# Patient Record
Sex: FEMALE | Race: WHITE | Employment: OTHER | URBAN - METROPOLITAN AREA
[De-identification: names, ages, dates, MRNs, and addresses within clinical notes are randomized per-mention and may not be internally consistent; named-entity substitution may affect disease eponyms.]

---

## 2017-11-07 ENCOUNTER — RECORDS - HEALTHEAST (OUTPATIENT)
Dept: LAB | Facility: CLINIC | Age: 82
End: 2017-11-07

## 2017-11-08 LAB — HBA1C MFR BLD: 6.8 % (ref 4.2–6.1)

## 2018-01-09 ENCOUNTER — RECORDS - HEALTHEAST (OUTPATIENT)
Dept: LAB | Facility: CLINIC | Age: 83
End: 2018-01-09

## 2018-01-10 LAB — INR PPP: 2.18 (ref 0.9–1.1)

## 2018-01-12 ENCOUNTER — HOSPITAL ENCOUNTER (OUTPATIENT)
Dept: MRI IMAGING | Facility: CLINIC | Age: 83
Discharge: HOME | End: 2018-01-12
Payer: MEDICARE

## 2018-01-12 DIAGNOSIS — M25.512 CHRONIC LEFT SHOULDER PAIN: ICD-10-CM

## 2018-01-12 DIAGNOSIS — G89.29 CHRONIC LEFT SHOULDER PAIN: ICD-10-CM

## 2018-01-12 PROCEDURE — 73221 MRI JOINT UPR EXTREM W/O DYE: CPT | Mod: LT

## 2018-01-30 ENCOUNTER — RECORDS - HEALTHEAST (OUTPATIENT)
Dept: LAB | Facility: CLINIC | Age: 83
End: 2018-01-30

## 2018-01-31 LAB — INR PPP: 2.61 (ref 0.9–1.1)

## 2018-02-21 ENCOUNTER — RECORDS - HEALTHEAST (OUTPATIENT)
Dept: LAB | Facility: CLINIC | Age: 83
End: 2018-02-21

## 2018-02-21 LAB
ANION GAP SERPL CALCULATED.3IONS-SCNC: 5 MMOL/L (ref 5–18)
BUN SERPL-MCNC: 19 MG/DL (ref 8–28)
CALCIUM SERPL-MCNC: 9.4 MG/DL (ref 8.5–10.5)
CHLORIDE BLD-SCNC: 106 MMOL/L (ref 98–107)
CO2 SERPL-SCNC: 29 MMOL/L (ref 22–31)
CREAT SERPL-MCNC: 0.68 MG/DL (ref 0.6–1.1)
GFR SERPL CREATININE-BSD FRML MDRD: >60 ML/MIN/1.73M2
GLUCOSE BLD-MCNC: 106 MG/DL (ref 70–125)
INR PPP: 3.15 (ref 0.9–1.1)
POTASSIUM BLD-SCNC: 4.3 MMOL/L (ref 3.5–5)
SODIUM SERPL-SCNC: 140 MMOL/L (ref 136–145)

## 2018-03-06 ENCOUNTER — RECORDS - HEALTHEAST (OUTPATIENT)
Dept: LAB | Facility: CLINIC | Age: 83
End: 2018-03-06

## 2018-03-07 LAB — INR PPP: 2.68 (ref 0.9–1.1)

## 2018-03-20 ENCOUNTER — RECORDS - HEALTHEAST (OUTPATIENT)
Dept: LAB | Facility: CLINIC | Age: 83
End: 2018-03-20

## 2018-03-21 LAB — INR PPP: 2.86 (ref 0.9–1.1)

## 2018-03-23 ENCOUNTER — RECORDS - HEALTHEAST (OUTPATIENT)
Dept: LAB | Facility: CLINIC | Age: 83
End: 2018-03-23

## 2018-03-26 LAB — INR PPP: 2.37 (ref 0.9–1.1)

## 2018-04-02 ENCOUNTER — RECORDS - HEALTHEAST (OUTPATIENT)
Dept: LAB | Facility: CLINIC | Age: 83
End: 2018-04-02

## 2018-04-02 LAB — INR PPP: 2.15 (ref 0.9–1.1)

## 2018-04-13 ENCOUNTER — RECORDS - HEALTHEAST (OUTPATIENT)
Dept: LAB | Facility: CLINIC | Age: 83
End: 2018-04-13

## 2018-04-16 LAB — INR PPP: 2.74 (ref 0.9–1.1)

## 2018-04-27 ENCOUNTER — RECORDS - HEALTHEAST (OUTPATIENT)
Dept: LAB | Facility: CLINIC | Age: 83
End: 2018-04-27

## 2018-04-30 LAB — INR PPP: 2.63 (ref 0.9–1.1)

## 2018-05-21 ENCOUNTER — RECORDS - HEALTHEAST (OUTPATIENT)
Dept: LAB | Facility: CLINIC | Age: 83
End: 2018-05-21

## 2018-05-22 LAB — INR PPP: 4.19 (ref 0.9–1.1)

## 2018-05-23 ENCOUNTER — RECORDS - HEALTHEAST (OUTPATIENT)
Dept: LAB | Facility: CLINIC | Age: 83
End: 2018-05-23

## 2018-05-23 LAB — INR PPP: 3.97 (ref 0.9–1.1)

## 2018-05-24 ENCOUNTER — RECORDS - HEALTHEAST (OUTPATIENT)
Dept: LAB | Facility: CLINIC | Age: 83
End: 2018-05-24

## 2018-05-25 LAB — INR PPP: 1.6 (ref 0.9–1.1)

## 2018-05-29 ENCOUNTER — RECORDS - HEALTHEAST (OUTPATIENT)
Dept: LAB | Facility: CLINIC | Age: 83
End: 2018-05-29

## 2018-05-29 LAB — INR PPP: 1.69 (ref 0.9–1.1)

## 2018-05-31 ENCOUNTER — RECORDS - HEALTHEAST (OUTPATIENT)
Dept: LAB | Facility: CLINIC | Age: 83
End: 2018-05-31

## 2018-06-01 LAB — INR PPP: 2.17 (ref 0.9–1.1)

## 2018-06-07 ENCOUNTER — RECORDS - HEALTHEAST (OUTPATIENT)
Dept: LAB | Facility: CLINIC | Age: 83
End: 2018-06-07

## 2018-06-07 LAB — INR PPP: 2.29 (ref 0.9–1.1)

## 2018-06-13 ENCOUNTER — RECORDS - HEALTHEAST (OUTPATIENT)
Dept: LAB | Facility: CLINIC | Age: 83
End: 2018-06-13

## 2018-06-14 LAB
INR PPP: 2.26 (ref 0.9–1.1)
TSH SERPL DL<=0.005 MIU/L-ACNC: 14.75 UIU/ML (ref 0.3–5)

## 2018-06-27 ENCOUNTER — RECORDS - HEALTHEAST (OUTPATIENT)
Dept: LAB | Facility: CLINIC | Age: 83
End: 2018-06-27

## 2018-06-28 LAB — INR PPP: 1.99 (ref 0.9–1.1)

## 2018-07-11 ENCOUNTER — RECORDS - HEALTHEAST (OUTPATIENT)
Dept: LAB | Facility: CLINIC | Age: 83
End: 2018-07-11

## 2018-07-12 LAB — INR PPP: 2.41 (ref 0.9–1.1)

## 2018-07-23 ENCOUNTER — RECORDS - HEALTHEAST (OUTPATIENT)
Dept: LAB | Facility: CLINIC | Age: 83
End: 2018-07-23

## 2018-07-23 LAB — INR PPP: 2.49 (ref 0.9–1.1)

## 2018-07-25 ENCOUNTER — RECORDS - HEALTHEAST (OUTPATIENT)
Dept: LAB | Facility: CLINIC | Age: 83
End: 2018-07-25

## 2018-07-26 LAB — TSH SERPL DL<=0.005 MIU/L-ACNC: 5.47 UIU/ML (ref 0.3–5)

## 2018-08-03 ENCOUNTER — RECORDS - HEALTHEAST (OUTPATIENT)
Dept: LAB | Facility: CLINIC | Age: 83
End: 2018-08-03

## 2018-08-06 LAB — INR PPP: 3.33 (ref 0.9–1.1)

## 2018-08-11 ENCOUNTER — RECORDS - HEALTHEAST (OUTPATIENT)
Dept: LAB | Facility: CLINIC | Age: 83
End: 2018-08-11

## 2018-08-13 LAB — INR PPP: 1.96 (ref 0.9–1.1)

## 2018-08-17 ENCOUNTER — RECORDS - HEALTHEAST (OUTPATIENT)
Dept: LAB | Facility: CLINIC | Age: 83
End: 2018-08-17

## 2018-08-20 LAB — INR PPP: 2.11 (ref 0.9–1.1)

## 2018-08-25 ENCOUNTER — RECORDS - HEALTHEAST (OUTPATIENT)
Dept: LAB | Facility: CLINIC | Age: 83
End: 2018-08-25

## 2018-08-27 LAB — INR PPP: 2.29 (ref 0.9–1.1)

## 2018-09-07 ENCOUNTER — RECORDS - HEALTHEAST (OUTPATIENT)
Dept: LAB | Facility: CLINIC | Age: 83
End: 2018-09-07

## 2018-09-10 LAB — INR PPP: 2.05 (ref 0.9–1.1)

## 2018-09-24 ENCOUNTER — RECORDS - HEALTHEAST (OUTPATIENT)
Dept: LAB | Facility: CLINIC | Age: 83
End: 2018-09-24

## 2018-09-24 LAB — INR PPP: 2.69 (ref 0.9–1.1)

## 2018-10-15 ENCOUNTER — RECORDS - HEALTHEAST (OUTPATIENT)
Dept: LAB | Facility: CLINIC | Age: 83
End: 2018-10-15

## 2018-10-16 LAB — INR PPP: 2.12 (ref 0.9–1.1)

## 2018-10-29 ENCOUNTER — RECORDS - HEALTHEAST (OUTPATIENT)
Dept: LAB | Facility: CLINIC | Age: 83
End: 2018-10-29

## 2018-10-30 LAB — INR PPP: 2.09 (ref 0.9–1.1)

## 2018-11-19 ENCOUNTER — RECORDS - HEALTHEAST (OUTPATIENT)
Dept: LAB | Facility: CLINIC | Age: 83
End: 2018-11-19

## 2018-11-20 LAB — INR PPP: 2.21 (ref 0.9–1.1)

## 2018-12-11 ENCOUNTER — RECORDS - HEALTHEAST (OUTPATIENT)
Dept: LAB | Facility: CLINIC | Age: 83
End: 2018-12-11

## 2018-12-11 LAB — INR PPP: 2.84 (ref 0.9–1.1)

## 2019-01-07 ENCOUNTER — RECORDS - HEALTHEAST (OUTPATIENT)
Dept: LAB | Facility: CLINIC | Age: 84
End: 2019-01-07

## 2019-01-08 LAB — INR PPP: 5.31 (ref 0.9–1.1)

## 2019-01-10 ENCOUNTER — RECORDS - HEALTHEAST (OUTPATIENT)
Dept: LAB | Facility: CLINIC | Age: 84
End: 2019-01-10

## 2019-01-10 LAB — INR PPP: 3.74 (ref 0.9–1.1)

## 2019-01-11 ENCOUNTER — RECORDS - HEALTHEAST (OUTPATIENT)
Dept: LAB | Facility: CLINIC | Age: 84
End: 2019-01-11

## 2019-01-11 LAB — INR PPP: 2.29 (ref 0.9–1.1)

## 2019-01-12 ENCOUNTER — RECORDS - HEALTHEAST (OUTPATIENT)
Dept: LAB | Facility: CLINIC | Age: 84
End: 2019-01-12

## 2019-01-14 LAB — INR PPP: 1.91 (ref 0.9–1.1)

## 2019-01-17 ENCOUNTER — RECORDS - HEALTHEAST (OUTPATIENT)
Dept: LAB | Facility: CLINIC | Age: 84
End: 2019-01-17

## 2019-01-18 LAB — INR PPP: 2.93 (ref 0.9–1.1)

## 2019-01-21 ENCOUNTER — RECORDS - HEALTHEAST (OUTPATIENT)
Dept: LAB | Facility: CLINIC | Age: 84
End: 2019-01-21

## 2019-01-22 LAB — INR PPP: 2.97 (ref 0.9–1.1)

## 2019-01-29 ENCOUNTER — RECORDS - HEALTHEAST (OUTPATIENT)
Dept: LAB | Facility: CLINIC | Age: 84
End: 2019-01-29

## 2019-01-29 LAB — INR PPP: 1.89 (ref 0.9–1.1)

## 2019-02-04 ENCOUNTER — RECORDS - HEALTHEAST (OUTPATIENT)
Dept: LAB | Facility: CLINIC | Age: 84
End: 2019-02-04

## 2019-02-05 LAB — INR PPP: 1.78 (ref 0.9–1.1)

## 2019-02-11 ENCOUNTER — RECORDS - HEALTHEAST (OUTPATIENT)
Dept: LAB | Facility: CLINIC | Age: 84
End: 2019-02-11

## 2019-02-12 LAB
ANION GAP SERPL CALCULATED.3IONS-SCNC: 7 MMOL/L (ref 5–18)
BUN SERPL-MCNC: 14 MG/DL (ref 8–28)
CALCIUM SERPL-MCNC: 9.2 MG/DL (ref 8.5–10.5)
CHLORIDE BLD-SCNC: 107 MMOL/L (ref 98–107)
CO2 SERPL-SCNC: 27 MMOL/L (ref 22–31)
CREAT SERPL-MCNC: 0.64 MG/DL (ref 0.6–1.1)
GFR SERPL CREATININE-BSD FRML MDRD: >60 ML/MIN/1.73M2
GLUCOSE BLD-MCNC: 122 MG/DL (ref 70–125)
INR PPP: 2.71 (ref 0.9–1.1)
POTASSIUM BLD-SCNC: 4.5 MMOL/L (ref 3.5–5)
SODIUM SERPL-SCNC: 141 MMOL/L (ref 136–145)
TSH SERPL DL<=0.005 MIU/L-ACNC: 0.29 UIU/ML (ref 0.3–5)

## 2019-02-18 ENCOUNTER — RECORDS - HEALTHEAST (OUTPATIENT)
Dept: LAB | Facility: CLINIC | Age: 84
End: 2019-02-18

## 2019-02-19 LAB — INR PPP: 2.35 (ref 0.9–1.1)

## 2019-02-26 ENCOUNTER — RECORDS - HEALTHEAST (OUTPATIENT)
Dept: LAB | Facility: CLINIC | Age: 84
End: 2019-02-26

## 2019-02-26 LAB — INR PPP: 2.44 (ref 0.9–1.1)

## 2019-03-04 ENCOUNTER — RECORDS - HEALTHEAST (OUTPATIENT)
Dept: LAB | Facility: CLINIC | Age: 84
End: 2019-03-04

## 2019-03-05 LAB — INR PPP: 2.27 (ref 0.9–1.1)

## 2019-03-18 ENCOUNTER — RECORDS - HEALTHEAST (OUTPATIENT)
Dept: LAB | Facility: CLINIC | Age: 84
End: 2019-03-18

## 2019-03-19 LAB — INR PPP: 2.28 (ref 0.9–1.1)

## 2019-04-02 ENCOUNTER — RECORDS - HEALTHEAST (OUTPATIENT)
Dept: LAB | Facility: CLINIC | Age: 84
End: 2019-04-02

## 2019-04-02 LAB — INR PPP: 1.68 (ref 0.9–1.1)

## 2019-04-04 ENCOUNTER — RECORDS - HEALTHEAST (OUTPATIENT)
Dept: LAB | Facility: CLINIC | Age: 84
End: 2019-04-04

## 2019-04-05 LAB — INR PPP: 1.95 (ref 0.9–1.1)

## 2019-04-09 ENCOUNTER — RECORDS - HEALTHEAST (OUTPATIENT)
Dept: LAB | Facility: CLINIC | Age: 84
End: 2019-04-09

## 2019-04-10 LAB — INR PPP: 1.9 (ref 0.9–1.1)

## 2019-04-17 ENCOUNTER — RECORDS - HEALTHEAST (OUTPATIENT)
Dept: LAB | Facility: CLINIC | Age: 84
End: 2019-04-17

## 2019-04-17 LAB — INR PPP: 1.85 (ref 0.9–1.1)

## 2019-04-25 ENCOUNTER — RECORDS - HEALTHEAST (OUTPATIENT)
Dept: LAB | Facility: CLINIC | Age: 84
End: 2019-04-25

## 2019-04-25 LAB — INR PPP: 1.87 (ref 0.9–1.1)

## 2019-05-01 ENCOUNTER — RECORDS - HEALTHEAST (OUTPATIENT)
Dept: LAB | Facility: CLINIC | Age: 84
End: 2019-05-01

## 2019-05-02 LAB — INR PPP: 2.01 (ref 0.9–1.1)

## 2019-05-15 ENCOUNTER — RECORDS - HEALTHEAST (OUTPATIENT)
Dept: LAB | Facility: CLINIC | Age: 84
End: 2019-05-15

## 2019-05-16 LAB — INR PPP: 1.95 (ref 0.9–1.1)

## 2019-05-22 ENCOUNTER — RECORDS - HEALTHEAST (OUTPATIENT)
Dept: LAB | Facility: CLINIC | Age: 84
End: 2019-05-22

## 2019-05-23 LAB — INR PPP: 2.54 (ref 0.9–1.1)

## 2019-05-29 ENCOUNTER — RECORDS - HEALTHEAST (OUTPATIENT)
Dept: LAB | Facility: CLINIC | Age: 84
End: 2019-05-29

## 2019-05-30 LAB — INR PPP: 2.09 (ref 0.9–1.1)

## 2019-06-05 ENCOUNTER — RECORDS - HEALTHEAST (OUTPATIENT)
Dept: LAB | Facility: CLINIC | Age: 84
End: 2019-06-05

## 2019-06-06 LAB
HBA1C MFR BLD: 7.3 % (ref 4.2–6.1)
INR PPP: 2.29 (ref 0.9–1.1)
TSH SERPL DL<=0.005 MIU/L-ACNC: 0.23 UIU/ML (ref 0.3–5)

## 2019-06-19 ENCOUNTER — RECORDS - HEALTHEAST (OUTPATIENT)
Dept: LAB | Facility: CLINIC | Age: 84
End: 2019-06-19

## 2019-06-20 LAB — INR PPP: 2.46 (ref 0.9–1.1)

## 2019-07-03 ENCOUNTER — RECORDS - HEALTHEAST (OUTPATIENT)
Dept: LAB | Facility: CLINIC | Age: 84
End: 2019-07-03

## 2019-07-03 LAB — INR PPP: 3.01 (ref 0.9–1.1)

## 2019-07-09 ENCOUNTER — RECORDS - HEALTHEAST (OUTPATIENT)
Dept: LAB | Facility: CLINIC | Age: 84
End: 2019-07-09

## 2019-07-10 LAB — INR PPP: 2.76 (ref 0.9–1.1)

## 2019-07-17 ENCOUNTER — RECORDS - HEALTHEAST (OUTPATIENT)
Dept: LAB | Facility: CLINIC | Age: 84
End: 2019-07-17

## 2019-07-18 LAB — TSH SERPL DL<=0.005 MIU/L-ACNC: 0.4 UIU/ML (ref 0.3–5)

## 2019-07-23 ENCOUNTER — RECORDS - HEALTHEAST (OUTPATIENT)
Dept: LAB | Facility: CLINIC | Age: 84
End: 2019-07-23

## 2019-07-24 LAB — INR PPP: 2.25 (ref 0.9–1.1)

## 2019-08-06 ENCOUNTER — RECORDS - HEALTHEAST (OUTPATIENT)
Dept: LAB | Facility: CLINIC | Age: 84
End: 2019-08-06

## 2019-08-07 LAB — INR PPP: 1.97 (ref 0.9–1.1)

## 2019-08-13 ENCOUNTER — RECORDS - HEALTHEAST (OUTPATIENT)
Dept: LAB | Facility: CLINIC | Age: 84
End: 2019-08-13

## 2019-08-14 LAB — INR PPP: 2.1 (ref 0.9–1.1)

## 2019-08-27 ENCOUNTER — RECORDS - HEALTHEAST (OUTPATIENT)
Dept: LAB | Facility: CLINIC | Age: 84
End: 2019-08-27

## 2019-08-28 LAB — INR PPP: 2.23 (ref 0.9–1.1)

## 2019-09-10 ENCOUNTER — RECORDS - HEALTHEAST (OUTPATIENT)
Dept: LAB | Facility: CLINIC | Age: 84
End: 2019-09-10

## 2019-09-11 LAB — INR PPP: 2.11 (ref 0.9–1.1)

## 2019-10-01 ENCOUNTER — RECORDS - HEALTHEAST (OUTPATIENT)
Dept: LAB | Facility: CLINIC | Age: 84
End: 2019-10-01

## 2019-10-02 LAB — INR PPP: 2.35 (ref 0.9–1.1)

## 2019-10-22 ENCOUNTER — RECORDS - HEALTHEAST (OUTPATIENT)
Dept: LAB | Facility: CLINIC | Age: 84
End: 2019-10-22

## 2019-10-23 LAB
ANION GAP SERPL CALCULATED.3IONS-SCNC: 8 MMOL/L (ref 5–18)
BUN SERPL-MCNC: 18 MG/DL (ref 8–28)
CALCIUM SERPL-MCNC: 9.1 MG/DL (ref 8.5–10.5)
CHLORIDE BLD-SCNC: 107 MMOL/L (ref 98–107)
CO2 SERPL-SCNC: 25 MMOL/L (ref 22–31)
CREAT SERPL-MCNC: 0.71 MG/DL (ref 0.6–1.1)
GFR SERPL CREATININE-BSD FRML MDRD: >60 ML/MIN/1.73M2
GLUCOSE BLD-MCNC: 170 MG/DL (ref 70–125)
HBA1C MFR BLD: 7.8 % (ref 4.2–6.1)
HGB BLD-MCNC: 9.8 G/DL (ref 12–16)
INR PPP: 2.92 (ref 0.9–1.1)
POTASSIUM BLD-SCNC: 3.8 MMOL/L (ref 3.5–5)
SODIUM SERPL-SCNC: 140 MMOL/L (ref 136–145)

## 2019-11-12 ENCOUNTER — RECORDS - HEALTHEAST (OUTPATIENT)
Dept: LAB | Facility: CLINIC | Age: 84
End: 2019-11-12

## 2019-11-13 LAB — INR PPP: 2.5 (ref 0.9–1.1)

## 2019-12-11 ENCOUNTER — RECORDS - HEALTHEAST (OUTPATIENT)
Dept: LAB | Facility: CLINIC | Age: 84
End: 2019-12-11

## 2019-12-11 LAB
25(OH)D3 SERPL-MCNC: 37.5 NG/ML (ref 30–80)
INR PPP: 2.24 (ref 0.9–1.1)

## 2020-01-08 ENCOUNTER — RECORDS - HEALTHEAST (OUTPATIENT)
Dept: LAB | Facility: CLINIC | Age: 85
End: 2020-01-08

## 2020-01-08 LAB — INR PPP: 2.55 (ref 0.9–1.1)

## 2020-02-04 ENCOUNTER — RECORDS - HEALTHEAST (OUTPATIENT)
Dept: LAB | Facility: CLINIC | Age: 85
End: 2020-02-04

## 2020-02-05 LAB — INR PPP: 4.41 (ref 0.9–1.1)

## 2020-02-06 ENCOUNTER — RECORDS - HEALTHEAST (OUTPATIENT)
Dept: LAB | Facility: CLINIC | Age: 85
End: 2020-02-06

## 2020-02-06 LAB — INR PPP: 4.31 (ref 0.9–1.1)

## 2020-02-07 ENCOUNTER — RECORDS - HEALTHEAST (OUTPATIENT)
Dept: LAB | Facility: CLINIC | Age: 85
End: 2020-02-07

## 2020-02-07 LAB — INR PPP: 3.22 (ref 0.9–1.1)

## 2020-02-10 ENCOUNTER — RECORDS - HEALTHEAST (OUTPATIENT)
Dept: LAB | Facility: CLINIC | Age: 85
End: 2020-02-10

## 2020-02-10 LAB — INR PPP: 1.55 (ref 0.9–1.1)

## 2020-02-12 ENCOUNTER — RECORDS - HEALTHEAST (OUTPATIENT)
Dept: LAB | Facility: CLINIC | Age: 85
End: 2020-02-12

## 2020-02-12 LAB — INR PPP: 1.85 (ref 0.9–1.1)

## 2020-02-13 ENCOUNTER — RECORDS - HEALTHEAST (OUTPATIENT)
Dept: LAB | Facility: CLINIC | Age: 85
End: 2020-02-13

## 2020-02-14 LAB — INR PPP: 2.65 (ref 0.9–1.1)

## 2020-02-19 ENCOUNTER — RECORDS - HEALTHEAST (OUTPATIENT)
Dept: LAB | Facility: CLINIC | Age: 85
End: 2020-02-19

## 2020-02-19 LAB
INR PPP: 2.34 (ref 0.9–1.1)
TSH SERPL DL<=0.005 MIU/L-ACNC: 0.02 UIU/ML (ref 0.3–5)

## 2020-02-25 ENCOUNTER — RECORDS - HEALTHEAST (OUTPATIENT)
Dept: LAB | Facility: CLINIC | Age: 85
End: 2020-02-25

## 2020-02-26 LAB — INR PPP: 2.82 (ref 0.9–1.1)

## 2020-03-10 ENCOUNTER — RECORDS - HEALTHEAST (OUTPATIENT)
Dept: LAB | Facility: CLINIC | Age: 85
End: 2020-03-10

## 2020-03-11 LAB — INR PPP: 3 (ref 0.9–1.1)

## 2020-03-17 ENCOUNTER — RECORDS - HEALTHEAST (OUTPATIENT)
Dept: LAB | Facility: CLINIC | Age: 85
End: 2020-03-17

## 2020-03-18 LAB — INR PPP: 2.9 (ref 0.9–1.1)

## 2020-03-25 ENCOUNTER — RECORDS - HEALTHEAST (OUTPATIENT)
Dept: LAB | Facility: CLINIC | Age: 85
End: 2020-03-25

## 2020-03-25 LAB — INR PPP: 2.22 (ref 0.9–1.1)

## 2020-04-01 ENCOUNTER — RECORDS - HEALTHEAST (OUTPATIENT)
Dept: LAB | Facility: CLINIC | Age: 85
End: 2020-04-01

## 2020-04-02 LAB — TSH SERPL DL<=0.005 MIU/L-ACNC: 0.03 UIU/ML (ref 0.3–5)

## 2020-04-08 ENCOUNTER — RECORDS - HEALTHEAST (OUTPATIENT)
Dept: LAB | Facility: CLINIC | Age: 85
End: 2020-04-08

## 2020-04-08 LAB — INR PPP: 1.75 (ref 0.9–1.1)

## 2020-04-13 ENCOUNTER — RECORDS - HEALTHEAST (OUTPATIENT)
Dept: LAB | Facility: CLINIC | Age: 85
End: 2020-04-13

## 2020-04-13 LAB — INR PPP: 1.85 (ref 0.9–1.1)

## 2020-04-20 ENCOUNTER — RECORDS - HEALTHEAST (OUTPATIENT)
Dept: LAB | Facility: CLINIC | Age: 85
End: 2020-04-20

## 2020-04-20 LAB — INR PPP: 2.25 (ref 0.9–1.1)

## 2020-04-24 ENCOUNTER — RECORDS - HEALTHEAST (OUTPATIENT)
Dept: LAB | Facility: CLINIC | Age: 85
End: 2020-04-24

## 2020-04-27 LAB — INR PPP: 2.21 (ref 0.9–1.1)

## 2020-05-01 ENCOUNTER — RECORDS - HEALTHEAST (OUTPATIENT)
Dept: LAB | Facility: CLINIC | Age: 85
End: 2020-05-01

## 2020-05-04 LAB — INR PPP: 2.32 (ref 0.9–1.1)

## 2020-05-20 ENCOUNTER — RECORDS - HEALTHEAST (OUTPATIENT)
Dept: LAB | Facility: CLINIC | Age: 85
End: 2020-05-20

## 2020-05-20 LAB — INR PPP: 2.89 (ref 0.9–1.1)

## 2020-06-03 ENCOUNTER — RECORDS - HEALTHEAST (OUTPATIENT)
Dept: LAB | Facility: CLINIC | Age: 85
End: 2020-06-03

## 2020-06-03 LAB
ANION GAP SERPL CALCULATED.3IONS-SCNC: 10 MMOL/L (ref 5–18)
BUN SERPL-MCNC: 14 MG/DL (ref 8–28)
CALCIUM SERPL-MCNC: 9 MG/DL (ref 8.5–10.5)
CHLORIDE BLD-SCNC: 108 MMOL/L (ref 98–107)
CO2 SERPL-SCNC: 23 MMOL/L (ref 22–31)
CREAT SERPL-MCNC: 0.67 MG/DL (ref 0.6–1.1)
GFR SERPL CREATININE-BSD FRML MDRD: >60 ML/MIN/1.73M2
GLUCOSE BLD-MCNC: 113 MG/DL (ref 70–125)
INR PPP: 1.73 (ref 0.9–1.1)
POTASSIUM BLD-SCNC: 4.7 MMOL/L (ref 3.5–5)
SODIUM SERPL-SCNC: 141 MMOL/L (ref 136–145)
TSH SERPL DL<=0.005 MIU/L-ACNC: 0.01 UIU/ML (ref 0.3–5)

## 2020-06-08 ENCOUNTER — RECORDS - HEALTHEAST (OUTPATIENT)
Dept: LAB | Facility: CLINIC | Age: 85
End: 2020-06-08

## 2020-06-08 LAB — INR PPP: 2.3 (ref 0.9–1.1)

## 2020-06-15 ENCOUNTER — RECORDS - HEALTHEAST (OUTPATIENT)
Dept: LAB | Facility: CLINIC | Age: 85
End: 2020-06-15

## 2020-06-15 LAB — INR PPP: 2.16 (ref 0.9–1.1)

## 2020-06-19 ENCOUNTER — RECORDS - HEALTHEAST (OUTPATIENT)
Dept: LAB | Facility: CLINIC | Age: 85
End: 2020-06-19

## 2020-06-22 LAB — INR PPP: 2.11 (ref 0.9–1.1)

## 2020-07-06 ENCOUNTER — RECORDS - HEALTHEAST (OUTPATIENT)
Dept: LAB | Facility: CLINIC | Age: 85
End: 2020-07-06

## 2020-07-07 LAB — INR PPP: 1.91 (ref 0.9–1.1)

## 2020-07-13 ENCOUNTER — RECORDS - HEALTHEAST (OUTPATIENT)
Dept: LAB | Facility: CLINIC | Age: 85
End: 2020-07-13

## 2020-07-14 LAB — INR PPP: 1.79 (ref 0.9–1.1)

## 2020-07-20 ENCOUNTER — RECORDS - HEALTHEAST (OUTPATIENT)
Dept: LAB | Facility: CLINIC | Age: 85
End: 2020-07-20

## 2020-07-21 LAB — INR PPP: 1.62 (ref 0.9–1.1)

## 2020-07-22 ENCOUNTER — RECORDS - HEALTHEAST (OUTPATIENT)
Dept: LAB | Facility: CLINIC | Age: 85
End: 2020-07-22

## 2020-07-23 ENCOUNTER — RECORDS - HEALTHEAST (OUTPATIENT)
Dept: LAB | Facility: CLINIC | Age: 85
End: 2020-07-23

## 2020-07-23 LAB — INR PPP: 1.62 (ref 0.9–1.1)

## 2020-07-24 LAB
INR PPP: 1.69 (ref 0.9–1.1)
TSH SERPL DL<=0.005 MIU/L-ACNC: 0.61 UIU/ML (ref 0.3–5)

## 2020-07-26 ENCOUNTER — RECORDS - HEALTHEAST (OUTPATIENT)
Dept: LAB | Facility: CLINIC | Age: 85
End: 2020-07-26

## 2020-07-27 LAB — INR PPP: 2.22 (ref 0.9–1.1)

## 2020-07-31 ENCOUNTER — RECORDS - HEALTHEAST (OUTPATIENT)
Dept: LAB | Facility: CLINIC | Age: 85
End: 2020-07-31

## 2020-08-03 LAB — INR PPP: 2.4 (ref 0.9–1.1)

## 2020-08-06 ENCOUNTER — RECORDS - HEALTHEAST (OUTPATIENT)
Dept: LAB | Facility: CLINIC | Age: 85
End: 2020-08-06

## 2020-08-07 LAB — INR PPP: 3.09 (ref 0.9–1.1)

## 2020-08-12 ENCOUNTER — RECORDS - HEALTHEAST (OUTPATIENT)
Dept: LAB | Facility: CLINIC | Age: 85
End: 2020-08-12

## 2020-08-13 LAB — INR PPP: 2.71 (ref 0.9–1.1)

## 2020-08-19 ENCOUNTER — RECORDS - HEALTHEAST (OUTPATIENT)
Dept: LAB | Facility: CLINIC | Age: 85
End: 2020-08-19

## 2020-08-21 ENCOUNTER — RECORDS - HEALTHEAST (OUTPATIENT)
Dept: LAB | Facility: CLINIC | Age: 85
End: 2020-08-21

## 2020-08-21 LAB — INR PPP: 4.58 (ref 0.9–1.1)

## 2020-08-24 LAB — INR PPP: 2.05 (ref 0.9–1.1)

## 2020-08-27 ENCOUNTER — RECORDS - HEALTHEAST (OUTPATIENT)
Dept: LAB | Facility: CLINIC | Age: 85
End: 2020-08-27

## 2020-08-28 LAB — INR PPP: 1.94 (ref 0.9–1.1)

## 2020-09-03 ENCOUNTER — RECORDS - HEALTHEAST (OUTPATIENT)
Dept: LAB | Facility: CLINIC | Age: 85
End: 2020-09-03

## 2020-09-04 LAB — INR PPP: 3.82 (ref 0.9–1.1)

## 2020-09-06 ENCOUNTER — RECORDS - HEALTHEAST (OUTPATIENT)
Dept: LAB | Facility: CLINIC | Age: 85
End: 2020-09-06

## 2020-09-08 LAB — INR PPP: 2.15 (ref 0.9–1.1)

## 2020-09-14 ENCOUNTER — RECORDS - HEALTHEAST (OUTPATIENT)
Dept: LAB | Facility: CLINIC | Age: 85
End: 2020-09-14

## 2020-09-15 LAB — INR PPP: 2.43 (ref 0.9–1.1)

## 2020-09-28 ENCOUNTER — RECORDS - HEALTHEAST (OUTPATIENT)
Dept: LAB | Facility: CLINIC | Age: 85
End: 2020-09-28

## 2020-09-29 LAB
INR PPP: 3.19 (ref 0.9–1.1)
TSH SERPL DL<=0.005 MIU/L-ACNC: 0.82 UIU/ML (ref 0.3–5)

## 2020-10-05 ENCOUNTER — RECORDS - HEALTHEAST (OUTPATIENT)
Dept: LAB | Facility: CLINIC | Age: 85
End: 2020-10-05

## 2020-10-06 LAB — INR PPP: 2.96 (ref 0.9–1.1)

## 2020-10-12 ENCOUNTER — RECORDS - HEALTHEAST (OUTPATIENT)
Dept: LAB | Facility: CLINIC | Age: 85
End: 2020-10-12

## 2020-10-13 LAB — INR PPP: 2.89 (ref 0.9–1.1)

## 2020-10-19 ENCOUNTER — RECORDS - HEALTHEAST (OUTPATIENT)
Dept: LAB | Facility: CLINIC | Age: 85
End: 2020-10-19

## 2020-10-20 LAB — INR PPP: 2.32 (ref 0.9–1.1)

## 2020-11-02 ENCOUNTER — RECORDS - HEALTHEAST (OUTPATIENT)
Dept: LAB | Facility: CLINIC | Age: 85
End: 2020-11-02

## 2020-11-03 LAB — INR PPP: 2.99 (ref 0.9–1.1)

## 2020-11-17 ENCOUNTER — RECORDS - HEALTHEAST (OUTPATIENT)
Dept: LAB | Facility: CLINIC | Age: 85
End: 2020-11-17

## 2020-11-18 LAB — INR PPP: 1.77 (ref 0.9–1.1)

## 2020-11-20 ENCOUNTER — RECORDS - HEALTHEAST (OUTPATIENT)
Dept: LAB | Facility: CLINIC | Age: 85
End: 2020-11-20

## 2020-11-23 LAB — INR PPP: 2.47 (ref 0.9–1.1)

## 2020-11-25 ENCOUNTER — RECORDS - HEALTHEAST (OUTPATIENT)
Dept: LAB | Facility: CLINIC | Age: 85
End: 2020-11-25

## 2020-11-27 LAB — INR PPP: 2.84 (ref 0.9–1.1)

## 2020-11-30 ENCOUNTER — RECORDS - HEALTHEAST (OUTPATIENT)
Dept: LAB | Facility: CLINIC | Age: 85
End: 2020-11-30

## 2020-12-01 LAB — INR PPP: 3.97 (ref 0.9–1.1)

## 2020-12-02 ENCOUNTER — RECORDS - HEALTHEAST (OUTPATIENT)
Dept: LAB | Facility: CLINIC | Age: 85
End: 2020-12-02

## 2020-12-03 LAB — INR PPP: 3.08 (ref 0.9–1.1)

## 2020-12-07 ENCOUNTER — RECORDS - HEALTHEAST (OUTPATIENT)
Dept: LAB | Facility: CLINIC | Age: 85
End: 2020-12-07

## 2020-12-08 LAB — INR PPP: 2.16 (ref 0.9–1.1)

## 2020-12-10 ENCOUNTER — RECORDS - HEALTHEAST (OUTPATIENT)
Dept: LAB | Facility: CLINIC | Age: 85
End: 2020-12-10

## 2020-12-11 LAB — INR PPP: 2.51 (ref 0.9–1.1)

## 2020-12-14 ENCOUNTER — RECORDS - HEALTHEAST (OUTPATIENT)
Dept: LAB | Facility: CLINIC | Age: 85
End: 2020-12-14

## 2020-12-15 LAB — INR PPP: 2.87 (ref 0.9–1.1)

## 2020-12-21 ENCOUNTER — RECORDS - HEALTHEAST (OUTPATIENT)
Dept: LAB | Facility: CLINIC | Age: 85
End: 2020-12-21

## 2020-12-22 LAB — INR PPP: 3.16 (ref 0.9–1.1)

## 2020-12-26 ENCOUNTER — RECORDS - HEALTHEAST (OUTPATIENT)
Dept: LAB | Facility: CLINIC | Age: 85
End: 2020-12-26

## 2020-12-29 LAB — INR PPP: 2.23 (ref 0.9–1.1)

## 2020-12-30 ENCOUNTER — RECORDS - HEALTHEAST (OUTPATIENT)
Dept: LAB | Facility: CLINIC | Age: 85
End: 2020-12-30

## 2021-01-05 LAB — INR PPP: 1.98 (ref 0.9–1.1)

## 2021-01-12 ENCOUNTER — RECORDS - HEALTHEAST (OUTPATIENT)
Dept: LAB | Facility: CLINIC | Age: 86
End: 2021-01-12

## 2021-01-13 LAB — INR PPP: 1.95 (ref 0.9–1.1)

## 2021-01-18 ENCOUNTER — RECORDS - HEALTHEAST (OUTPATIENT)
Dept: LAB | Facility: CLINIC | Age: 86
End: 2021-01-18

## 2021-01-20 LAB — INR PPP: 2.22 (ref 0.9–1.1)

## 2021-01-25 ENCOUNTER — RECORDS - HEALTHEAST (OUTPATIENT)
Dept: LAB | Facility: CLINIC | Age: 86
End: 2021-01-25

## 2021-01-27 LAB — INR PPP: 1.6 (ref 0.9–1.1)

## 2021-01-29 ENCOUNTER — RECORDS - HEALTHEAST (OUTPATIENT)
Dept: LAB | Facility: CLINIC | Age: 86
End: 2021-01-29

## 2021-02-01 LAB — INR PPP: 2.09 (ref 0.9–1.1)

## 2021-02-03 ENCOUNTER — RECORDS - HEALTHEAST (OUTPATIENT)
Dept: LAB | Facility: CLINIC | Age: 86
End: 2021-02-03

## 2021-02-05 LAB — INR PPP: 2.11 (ref 0.9–1.1)

## 2021-02-11 ENCOUNTER — RECORDS - HEALTHEAST (OUTPATIENT)
Dept: LAB | Facility: CLINIC | Age: 86
End: 2021-02-11

## 2021-02-12 LAB — INR PPP: 2.32 (ref 0.9–1.1)

## 2021-02-18 ENCOUNTER — RECORDS - HEALTHEAST (OUTPATIENT)
Dept: LAB | Facility: CLINIC | Age: 86
End: 2021-02-18

## 2021-02-19 LAB — INR PPP: 2.92 (ref 0.9–1.1)

## 2021-02-21 ENCOUNTER — APPOINTMENT (OUTPATIENT)
Dept: GENERAL RADIOLOGY | Facility: CLINIC | Age: 86
DRG: 378 | End: 2021-02-21
Attending: EMERGENCY MEDICINE
Payer: MEDICARE

## 2021-02-21 ENCOUNTER — HOSPITAL ENCOUNTER (INPATIENT)
Facility: CLINIC | Age: 86
LOS: 3 days | Discharge: NURSING, REHAB OR LONG-TERM CARE | DRG: 378 | End: 2021-02-24
Attending: EMERGENCY MEDICINE | Admitting: STUDENT IN AN ORGANIZED HEALTH CARE EDUCATION/TRAINING PROGRAM
Payer: MEDICARE

## 2021-02-21 DIAGNOSIS — Z95.2 S/P AVR (AORTIC VALVE REPLACEMENT): ICD-10-CM

## 2021-02-21 DIAGNOSIS — L89.629 PRESSURE INJURY OF SKIN OF LEFT HEEL, UNSPECIFIED INJURY STAGE: ICD-10-CM

## 2021-02-21 DIAGNOSIS — Z66 DNR (DO NOT RESUSCITATE): ICD-10-CM

## 2021-02-21 DIAGNOSIS — Z79.01 CHRONIC ANTICOAGULATION: ICD-10-CM

## 2021-02-21 DIAGNOSIS — Z11.52 ENCOUNTER FOR SCREENING LABORATORY TESTING FOR SEVERE ACUTE RESPIRATORY SYNDROME CORONAVIRUS 2 (SARS-COV-2): ICD-10-CM

## 2021-02-21 DIAGNOSIS — I48.91 ATRIAL FIBRILLATION WITH RAPID VENTRICULAR RESPONSE (H): ICD-10-CM

## 2021-02-21 DIAGNOSIS — K92.2 LOWER GI BLEED: Primary | ICD-10-CM

## 2021-02-21 DIAGNOSIS — K92.2 GASTROINTESTINAL HEMORRHAGE, UNSPECIFIED GASTROINTESTINAL HEMORRHAGE TYPE: ICD-10-CM

## 2021-02-21 LAB
ABO + RH BLD: NORMAL
ABO + RH BLD: NORMAL
ALBUMIN SERPL-MCNC: 2.1 G/DL (ref 3.4–5)
ALP SERPL-CCNC: 139 U/L (ref 40–150)
ALT SERPL W P-5'-P-CCNC: 9 U/L (ref 0–50)
ANION GAP SERPL CALCULATED.3IONS-SCNC: 7 MMOL/L (ref 3–14)
AST SERPL W P-5'-P-CCNC: 13 U/L (ref 0–45)
BASOPHILS # BLD AUTO: 0 10E9/L (ref 0–0.2)
BASOPHILS NFR BLD AUTO: 0.4 %
BILIRUB SERPL-MCNC: 0.3 MG/DL (ref 0.2–1.3)
BLD GP AB SCN SERPL QL: NORMAL
BLD PROD TYP BPU: NORMAL
BLD UNIT ID BPU: 0
BLD UNIT ID BPU: 0
BLOOD BANK CMNT PATIENT-IMP: NORMAL
BLOOD PRODUCT CODE: NORMAL
BLOOD PRODUCT CODE: NORMAL
BPU ID: NORMAL
BPU ID: NORMAL
BUN SERPL-MCNC: 31 MG/DL (ref 7–30)
CALCIUM SERPL-MCNC: 8.7 MG/DL (ref 8.5–10.1)
CHLORIDE SERPL-SCNC: 108 MMOL/L (ref 94–109)
CO2 SERPL-SCNC: 26 MMOL/L (ref 20–32)
CREAT SERPL-MCNC: 0.78 MG/DL (ref 0.52–1.04)
DIFFERENTIAL METHOD BLD: ABNORMAL
EOSINOPHIL # BLD AUTO: 0.2 10E9/L (ref 0–0.7)
EOSINOPHIL NFR BLD AUTO: 1.4 %
ERYTHROCYTE [DISTWIDTH] IN BLOOD BY AUTOMATED COUNT: 14.6 % (ref 10–15)
GFR SERPL CREATININE-BSD FRML MDRD: 69 ML/MIN/{1.73_M2}
GLUCOSE BLDC GLUCOMTR-MCNC: 118 MG/DL (ref 70–99)
GLUCOSE BLDC GLUCOMTR-MCNC: 129 MG/DL (ref 70–99)
GLUCOSE SERPL-MCNC: 143 MG/DL (ref 70–99)
HCT VFR BLD AUTO: 25.5 % (ref 35–47)
HGB BLD-MCNC: 7.6 G/DL (ref 11.7–15.7)
HGB BLD-MCNC: 9.2 G/DL (ref 11.7–15.7)
IMM GRANULOCYTES # BLD: 0.2 10E9/L (ref 0–0.4)
IMM GRANULOCYTES NFR BLD: 1.5 %
INR PPP: 2.9 (ref 0.86–1.14)
LABORATORY COMMENT REPORT: NORMAL
LYMPHOCYTES # BLD AUTO: 1.7 10E9/L (ref 0.8–5.3)
LYMPHOCYTES NFR BLD AUTO: 16.1 %
MCH RBC QN AUTO: 29.3 PG (ref 26.5–33)
MCHC RBC AUTO-ENTMCNC: 29.8 G/DL (ref 31.5–36.5)
MCV RBC AUTO: 99 FL (ref 78–100)
MONOCYTES # BLD AUTO: 0.6 10E9/L (ref 0–1.3)
MONOCYTES NFR BLD AUTO: 5.8 %
NEUTROPHILS # BLD AUTO: 8 10E9/L (ref 1.6–8.3)
NEUTROPHILS NFR BLD AUTO: 74.8 %
NRBC # BLD AUTO: 0 10*3/UL
NRBC BLD AUTO-RTO: 0 /100
NT-PROBNP SERPL-MCNC: 3109 PG/ML (ref 0–1800)
NUM BPU REQUESTED: 2
PLATELET # BLD AUTO: 463 10E9/L (ref 150–450)
POTASSIUM SERPL-SCNC: 4.2 MMOL/L (ref 3.4–5.3)
POTASSIUM SERPL-SCNC: 4.3 MMOL/L (ref 3.4–5.3)
PROT SERPL-MCNC: 6.1 G/DL (ref 6.8–8.8)
RBC # BLD AUTO: 2.59 10E12/L (ref 3.8–5.2)
SARS-COV-2 RNA RESP QL NAA+PROBE: NEGATIVE
SODIUM SERPL-SCNC: 140 MMOL/L (ref 133–144)
SPECIMEN EXP DATE BLD: NORMAL
SPECIMEN SOURCE: NORMAL
TRANSFUSION STATUS PATIENT QL: NORMAL
TROPONIN I SERPL-MCNC: <0.015 UG/L (ref 0–0.04)
WBC # BLD AUTO: 10.7 10E9/L (ref 4–11)

## 2021-02-21 PROCEDURE — C9113 INJ PANTOPRAZOLE SODIUM, VIA: HCPCS | Performed by: STUDENT IN AN ORGANIZED HEALTH CARE EDUCATION/TRAINING PROGRAM

## 2021-02-21 PROCEDURE — 85610 PROTHROMBIN TIME: CPT | Performed by: EMERGENCY MEDICINE

## 2021-02-21 PROCEDURE — 93005 ELECTROCARDIOGRAM TRACING: CPT

## 2021-02-21 PROCEDURE — U0005 INFEC AGEN DETEC AMPLI PROBE: HCPCS | Performed by: EMERGENCY MEDICINE

## 2021-02-21 PROCEDURE — 96366 THER/PROPH/DIAG IV INF ADDON: CPT

## 2021-02-21 PROCEDURE — C9803 HOPD COVID-19 SPEC COLLECT: HCPCS

## 2021-02-21 PROCEDURE — 86900 BLOOD TYPING SEROLOGIC ABO: CPT | Performed by: EMERGENCY MEDICINE

## 2021-02-21 PROCEDURE — 84484 ASSAY OF TROPONIN QUANT: CPT | Performed by: EMERGENCY MEDICINE

## 2021-02-21 PROCEDURE — 99223 1ST HOSP IP/OBS HIGH 75: CPT | Mod: AI | Performed by: STUDENT IN AN ORGANIZED HEALTH CARE EDUCATION/TRAINING PROGRAM

## 2021-02-21 PROCEDURE — 999N001017 HC STATISTIC GLUCOSE BY METER IP

## 2021-02-21 PROCEDURE — 84132 ASSAY OF SERUM POTASSIUM: CPT | Performed by: STUDENT IN AN ORGANIZED HEALTH CARE EDUCATION/TRAINING PROGRAM

## 2021-02-21 PROCEDURE — 71045 X-RAY EXAM CHEST 1 VIEW: CPT | Mod: 26

## 2021-02-21 PROCEDURE — U0003 INFECTIOUS AGENT DETECTION BY NUCLEIC ACID (DNA OR RNA); SEVERE ACUTE RESPIRATORY SYNDROME CORONAVIRUS 2 (SARS-COV-2) (CORONAVIRUS DISEASE [COVID-19]), AMPLIFIED PROBE TECHNIQUE, MAKING USE OF HIGH THROUGHPUT TECHNOLOGIES AS DESCRIBED BY CMS-2020-01-R: HCPCS | Performed by: EMERGENCY MEDICINE

## 2021-02-21 PROCEDURE — C9113 INJ PANTOPRAZOLE SODIUM, VIA: HCPCS | Performed by: EMERGENCY MEDICINE

## 2021-02-21 PROCEDURE — 120N000002 HC R&B MED SURG/OB UMMC

## 2021-02-21 PROCEDURE — 36430 TRANSFUSION BLD/BLD COMPNT: CPT

## 2021-02-21 PROCEDURE — 86901 BLOOD TYPING SEROLOGIC RH(D): CPT | Performed by: EMERGENCY MEDICINE

## 2021-02-21 PROCEDURE — 93010 ELECTROCARDIOGRAM REPORT: CPT | Performed by: INTERNAL MEDICINE

## 2021-02-21 PROCEDURE — 250N000011 HC RX IP 250 OP 636: Performed by: STUDENT IN AN ORGANIZED HEALTH CARE EDUCATION/TRAINING PROGRAM

## 2021-02-21 PROCEDURE — 96375 TX/PRO/DX INJ NEW DRUG ADDON: CPT

## 2021-02-21 PROCEDURE — 36415 COLL VENOUS BLD VENIPUNCTURE: CPT | Performed by: STUDENT IN AN ORGANIZED HEALTH CARE EDUCATION/TRAINING PROGRAM

## 2021-02-21 PROCEDURE — P9016 RBC LEUKOCYTES REDUCED: HCPCS | Performed by: EMERGENCY MEDICINE

## 2021-02-21 PROCEDURE — 85025 COMPLETE CBC W/AUTO DIFF WBC: CPT | Performed by: EMERGENCY MEDICINE

## 2021-02-21 PROCEDURE — 83880 ASSAY OF NATRIURETIC PEPTIDE: CPT | Performed by: EMERGENCY MEDICINE

## 2021-02-21 PROCEDURE — 86850 RBC ANTIBODY SCREEN: CPT | Performed by: EMERGENCY MEDICINE

## 2021-02-21 PROCEDURE — 86902 BLOOD TYPE ANTIGEN DONOR EA: CPT | Performed by: EMERGENCY MEDICINE

## 2021-02-21 PROCEDURE — 71045 X-RAY EXAM CHEST 1 VIEW: CPT

## 2021-02-21 PROCEDURE — 93010 ELECTROCARDIOGRAM REPORT: CPT | Performed by: EMERGENCY MEDICINE

## 2021-02-21 PROCEDURE — 258N000003 HC RX IP 258 OP 636: Performed by: EMERGENCY MEDICINE

## 2021-02-21 PROCEDURE — 250N000011 HC RX IP 250 OP 636: Performed by: EMERGENCY MEDICINE

## 2021-02-21 PROCEDURE — 86922 COMPATIBILITY TEST ANTIGLOB: CPT | Performed by: EMERGENCY MEDICINE

## 2021-02-21 PROCEDURE — 80053 COMPREHEN METABOLIC PANEL: CPT | Performed by: EMERGENCY MEDICINE

## 2021-02-21 PROCEDURE — 250N000013 HC RX MED GY IP 250 OP 250 PS 637: Performed by: STUDENT IN AN ORGANIZED HEALTH CARE EDUCATION/TRAINING PROGRAM

## 2021-02-21 PROCEDURE — 99285 EMERGENCY DEPT VISIT HI MDM: CPT | Mod: 25 | Performed by: EMERGENCY MEDICINE

## 2021-02-21 PROCEDURE — 96365 THER/PROPH/DIAG IV INF INIT: CPT

## 2021-02-21 PROCEDURE — 99285 EMERGENCY DEPT VISIT HI MDM: CPT | Mod: 25

## 2021-02-21 PROCEDURE — 85018 HEMOGLOBIN: CPT | Performed by: STUDENT IN AN ORGANIZED HEALTH CARE EDUCATION/TRAINING PROGRAM

## 2021-02-21 RX ORDER — DEXTROSE MONOHYDRATE 25 G/50ML
25-50 INJECTION, SOLUTION INTRAVENOUS
Status: DISCONTINUED | OUTPATIENT
Start: 2021-02-21 | End: 2021-02-24 | Stop reason: HOSPADM

## 2021-02-21 RX ORDER — ACETAMINOPHEN 325 MG/1
650 TABLET ORAL EVERY 6 HOURS PRN
Status: CANCELLED | OUTPATIENT
Start: 2021-02-21

## 2021-02-21 RX ORDER — ACETAMINOPHEN 500 MG
1000 TABLET ORAL 3 TIMES DAILY
Status: DISCONTINUED | OUTPATIENT
Start: 2021-02-21 | End: 2021-02-24 | Stop reason: HOSPADM

## 2021-02-21 RX ORDER — FUROSEMIDE 20 MG
20 TABLET ORAL DAILY
COMMUNITY

## 2021-02-21 RX ORDER — FUROSEMIDE 20 MG
40 TABLET ORAL DAILY
Status: CANCELLED | OUTPATIENT
Start: 2021-02-21

## 2021-02-21 RX ORDER — IPRATROPIUM BROMIDE AND ALBUTEROL SULFATE 2.5; .5 MG/3ML; MG/3ML
3 SOLUTION RESPIRATORY (INHALATION) EVERY 4 HOURS PRN
Status: DISCONTINUED | OUTPATIENT
Start: 2021-02-21 | End: 2021-02-24 | Stop reason: HOSPADM

## 2021-02-21 RX ORDER — AMOXICILLIN 500 MG/1
2000 CAPSULE ORAL DAILY
COMMUNITY

## 2021-02-21 RX ORDER — ALLOPURINOL 100 MG/1
100 TABLET ORAL DAILY
Status: DISCONTINUED | OUTPATIENT
Start: 2021-02-22 | End: 2021-02-24 | Stop reason: HOSPADM

## 2021-02-21 RX ORDER — SODIUM CHLORIDE 9 MG/ML
INJECTION, SOLUTION INTRAVENOUS CONTINUOUS
Status: DISCONTINUED | OUTPATIENT
Start: 2021-02-21 | End: 2021-02-22

## 2021-02-21 RX ORDER — OXYCODONE HYDROCHLORIDE 5 MG/1
5 TABLET ORAL EVERY 6 HOURS PRN
COMMUNITY

## 2021-02-21 RX ORDER — OXYCODONE HCL 10 MG/1
10 TABLET, FILM COATED, EXTENDED RELEASE ORAL EVERY 12 HOURS
COMMUNITY

## 2021-02-21 RX ORDER — LIDOCAINE 40 MG/G
CREAM TOPICAL
Status: DISCONTINUED | OUTPATIENT
Start: 2021-02-21 | End: 2021-02-24 | Stop reason: HOSPADM

## 2021-02-21 RX ORDER — LEVOTHYROXINE SODIUM 150 UG/1
150 TABLET ORAL DAILY
COMMUNITY

## 2021-02-21 RX ORDER — VITAMIN B COMPLEX
125 TABLET ORAL DAILY
Status: DISCONTINUED | OUTPATIENT
Start: 2021-02-22 | End: 2021-02-24 | Stop reason: HOSPADM

## 2021-02-21 RX ORDER — ALBUTEROL SULFATE 0.83 MG/ML
2.5 SOLUTION RESPIRATORY (INHALATION)
Status: CANCELLED | OUTPATIENT
Start: 2021-02-21

## 2021-02-21 RX ORDER — NICOTINE POLACRILEX 4 MG
15-30 LOZENGE BUCCAL
Status: DISCONTINUED | OUTPATIENT
Start: 2021-02-21 | End: 2021-02-24 | Stop reason: HOSPADM

## 2021-02-21 RX ORDER — OXYCODONE HCL 10 MG/1
10 TABLET, FILM COATED, EXTENDED RELEASE ORAL EVERY 12 HOURS
Status: DISCONTINUED | OUTPATIENT
Start: 2021-02-21 | End: 2021-02-24 | Stop reason: HOSPADM

## 2021-02-21 RX ORDER — ATORVASTATIN CALCIUM 10 MG/1
10 TABLET, FILM COATED ORAL DAILY
COMMUNITY

## 2021-02-21 RX ORDER — LEVOTHYROXINE SODIUM 150 UG/1
150 TABLET ORAL DAILY
Status: DISCONTINUED | OUTPATIENT
Start: 2021-02-22 | End: 2021-02-24 | Stop reason: HOSPADM

## 2021-02-21 RX ORDER — LEVOTHYROXINE SODIUM 175 UG/1
175 TABLET ORAL DAILY
Status: CANCELLED | OUTPATIENT
Start: 2021-02-21

## 2021-02-21 RX ORDER — AMOXICILLIN 250 MG
1-2 CAPSULE ORAL 2 TIMES DAILY PRN
Status: DISCONTINUED | OUTPATIENT
Start: 2021-02-21 | End: 2021-02-24 | Stop reason: HOSPADM

## 2021-02-21 RX ORDER — OXYCODONE HYDROCHLORIDE 5 MG/1
5 TABLET ORAL EVERY 6 HOURS PRN
Status: DISCONTINUED | OUTPATIENT
Start: 2021-02-21 | End: 2021-02-24 | Stop reason: HOSPADM

## 2021-02-21 RX ORDER — ONDANSETRON 2 MG/ML
4 INJECTION INTRAMUSCULAR; INTRAVENOUS EVERY 6 HOURS PRN
Status: DISCONTINUED | OUTPATIENT
Start: 2021-02-21 | End: 2021-02-24 | Stop reason: HOSPADM

## 2021-02-21 RX ORDER — METOPROLOL TARTRATE 50 MG
50 TABLET ORAL 2 TIMES DAILY
Status: CANCELLED | OUTPATIENT
Start: 2021-02-21

## 2021-02-21 RX ORDER — WARFARIN SODIUM 3 MG/1
3 TABLET ORAL DAILY
Status: ON HOLD | COMMUNITY
End: 2021-02-24

## 2021-02-21 RX ORDER — ATORVASTATIN CALCIUM 10 MG/1
10 TABLET, FILM COATED ORAL DAILY
Status: DISCONTINUED | OUTPATIENT
Start: 2021-02-22 | End: 2021-02-24 | Stop reason: HOSPADM

## 2021-02-21 RX ORDER — ACETAMINOPHEN 500 MG
1000 TABLET ORAL 3 TIMES DAILY
COMMUNITY

## 2021-02-21 RX ORDER — ATORVASTATIN CALCIUM 40 MG/1
80 TABLET, FILM COATED ORAL EVERY EVENING
Status: CANCELLED | OUTPATIENT
Start: 2021-02-21

## 2021-02-21 RX ORDER — OMEPRAZOLE 10 MG/1
10 CAPSULE, DELAYED RELEASE ORAL DAILY
COMMUNITY

## 2021-02-21 RX ORDER — MORPHINE SULFATE 2 MG/ML
2 INJECTION, SOLUTION INTRAMUSCULAR; INTRAVENOUS
Status: DISCONTINUED | OUTPATIENT
Start: 2021-02-21 | End: 2021-02-22

## 2021-02-21 RX ORDER — ONDANSETRON 4 MG/1
4 TABLET, ORALLY DISINTEGRATING ORAL EVERY 6 HOURS PRN
Status: DISCONTINUED | OUTPATIENT
Start: 2021-02-21 | End: 2021-02-24 | Stop reason: HOSPADM

## 2021-02-21 RX ADMIN — PANTOPRAZOLE SODIUM 40 MG: 40 INJECTION, POWDER, FOR SOLUTION INTRAVENOUS at 14:34

## 2021-02-21 RX ADMIN — SODIUM CHLORIDE: 9 INJECTION, SOLUTION INTRAVENOUS at 14:30

## 2021-02-21 RX ADMIN — MORPHINE SULFATE 2 MG: 2 INJECTION, SOLUTION INTRAMUSCULAR; INTRAVENOUS at 14:56

## 2021-02-21 RX ADMIN — OXYCODONE HYDROCHLORIDE 10 MG: 10 TABLET, FILM COATED, EXTENDED RELEASE ORAL at 21:32

## 2021-02-21 RX ADMIN — ACETAMINOPHEN 1000 MG: 500 TABLET, FILM COATED ORAL at 20:43

## 2021-02-21 RX ADMIN — PANTOPRAZOLE SODIUM 40 MG: 40 INJECTION, POWDER, FOR SOLUTION INTRAVENOUS at 21:32

## 2021-02-21 ASSESSMENT — ACTIVITIES OF DAILY LIVING (ADL)
DOING_ERRANDS_INDEPENDENTLY_DIFFICULTY: YES
DIFFICULTY_COMMUNICATING: NO
CONCENTRATING,_REMEMBERING_OR_MAKING_DECISIONS_DIFFICULTY: YES
TOILETING_ISSUES: YES
HEARING_DIFFICULTY_OR_DEAF: NO
DRESSING/BATHING_DIFFICULTY: YES
WALKING_OR_CLIMBING_STAIRS_DIFFICULTY: YES
DIFFICULTY_EATING/SWALLOWING: NO
WEAR_GLASSES_OR_BLIND: NO
PATIENT_/_FAMILY_COMMUNICATION_STYLE: SPOKEN LANGUAGE (ENGLISH OR BILINGUAL)
WALKING_OR_CLIMBING_STAIRS: TRANSFERRING DIFFICULTY, DEPENDENT;AMBULATION DIFFICULTY, DEPENDENT
EQUIPMENT_CURRENTLY_USED_AT_HOME: LIFT DEVICE;WHEELCHAIR, MANUAL
DRESSING/BATHING: DRESSING DIFFICULTY, DEPENDENT;BATHING DIFFICULTY, DEPENDENT

## 2021-02-21 ASSESSMENT — ENCOUNTER SYMPTOMS
ANAL BLEEDING: 1
COLOR CHANGE: 0
NECK STIFFNESS: 0
SHORTNESS OF BREATH: 0
EYE REDNESS: 0
ARTHRALGIAS: 0
ABDOMINAL PAIN: 0
CONFUSION: 0
HEADACHES: 0
FEVER: 0
DIFFICULTY URINATING: 0

## 2021-02-21 NOTE — ED PROVIDER NOTES
[facility] EMERGENCY DEPARTMENT ([facility]) 2/21/21  History     Chief Complaint   Patient presents with     Rectal Bleeding     The history is provided by the patient and medical records.     Louisa Albarran is an 86-year-old female from [facility] who was noted to have some clots in her briefs this morning and was sent to the ER for further evaluation.  The patient is on chronic anticoagulation with Coumadin being status post aortic valve replacement in the past. She denies any pain, vomiting, or knowledge of blood being in her stool.  Patient has multiple medical problems including diabetes, and a chronic pain disorder. Patient states that she normally has pretty good food at [facility], and otherwise denies any shortness of breath, chest pain, or fever  This part of the medical record was transcribed by Toby Dempsey Medical Scribe, from a dictation done by Chris Bradford MD.       Past Medical History  Past Medical History:   Diagnosis Date     Acute cholecystitis     w/ severe sepsis and choledocholithiasis     Antiplatelet or antithrombotic long-term use      Aortic stenosis     s/p valve replacement     Breast cancer (H) 1982     CAD (coronary artery disease)      Cowden syndrome (H)      Diastolic dysfunction      DM type 2 (diabetes mellitus, type 2) (H)      Generalized anxiety disorder      GERD (gastroesophageal reflux disease)      Hyperlipidemia      Hypertension      Hypothyroidism     s/p thyroidectomy     LBBB (left bundle branch block)      Ovarian ca (H) 1981     Right renal mass      Skin cancer      Thyrotoxicosis without mention of goiter or other cause, without mention of thyrotoxic crisis or storm 2001    thyroidectomy   Patient's records from [facility] revealed a POLST form showing her to be DNR/DNI      Past Surgical History:   Procedure Laterality Date     AORTIC VALVE REPLACEMENT  8/2005    [facility]     CORONARY ARTERY BYPASS  8/2005      ENDOSCOPIC RETROGRADE CHOLANGIOPANCREATOGRAM N/A 1/20/2016    Procedure: COMBINED ENDOSCOPIC RETROGRADE CHOLANGIOPANCREATOGRAPHY, PLACE TUBE/STENT;  Surgeon: Adair Hood MD;  Location: [facility] OR     ENDOSCOPIC RETROGRADE CHOLANGIOPANCREATOGRAM N/A 4/18/2016    Procedure: COMBINED ENDOSCOPIC RETROGRADE CHOLANGIOPANCREATOGRAPHY, REMOVE FOREIGN BODY OR STENT/TUBE;  Surgeon: Adair Hood MD;  Location: [facility] OR     ENDOSCOPIC ULTRASOUND UPPER GASTROINTESTINAL TRACT (GI) N/A 5/4/2016    Procedure: ENDOSCOPIC ULTRASOUND, ESOPHAGOSCOPY / UPPER GASTROINTESTINAL TRACT (GI);  Surgeon: Adair Hood MD;  Location: [facility] OR     HYSTERECTOMY, VAGINAL  1981     MASTECTOMY, SIMPLE RT/LT/GENARO Right 1982     SALPINGO OOPHORECTOMY,R/L/GENARO  1981     THYROIDECTOMY   2001     acetaminophen (TYLENOL) 325 MG tablet  albuterol (2.5 MG/3ML) 0.083% nebulizer solution  allopurinol (ZYLOPRIM) 100 MG tablet  atorvastatin (LIPITOR) 80 MG tablet  BLOOD GLUCOSE METER KIT  blood glucose monitoring (NO BRAND SPECIFIED) test strip  calcium citrate-vitamin D (CITRACAL) 315-200 MG-UNIT TABS  Esomeprazole Magnesium (NEXIUM PO)  Ferrous Sulfate 324 (65 FE) MG TBEC  furosemide (LASIX) 40 MG tablet  hypromellose (ARTIFICIAL TEARS) 0.4 % SOLN  ipratropium - albuterol 0.5 mg/2.5 mg/3 mL (DUONEB) 0.5-2.5 (3) MG/3ML nebulization  levothyroxine (SYNTHROID, LEVOTHROID) 175 MCG tablet  melatonin 1 MG TABS  metoprolol (LOPRESSOR) 50 MG tablet  ONE TOUCH LANCETS MISC  ORDER FOR DME  ORDER FOR DME  potassium chloride (K-DUR) 10 MEQ tablet  QUEtiapine (SEROQUEL) 25 MG tablet  senna-docusate (SENOKOT-S;PERICOLACE) 8.6-50 MG per tablet      Allergies   Allergen Reactions     Ciprofloxacin Other (See Comments)     Phlebitis when given IV     Family History  Family History   Problem Relation Age of Onset     Cancer Sister         ?ovarian cancer     Respiratory Brother      Social History   Social History     Tobacco Use     Smoking status: Never Smoker      Smokeless tobacco: Never Used   Substance Use Topics     Alcohol use: No     Drug use: No      Past medical history, past surgical history, medications, allergies, family history, and social history were reviewed with the patient. No additional pertinent items.       Review of Systems   Constitutional: Negative for fever.   HENT: Negative for congestion.    Eyes: Negative for redness.   Respiratory: Negative for shortness of breath.    Cardiovascular: Negative for chest pain.   Gastrointestinal: Positive for anal bleeding. Negative for abdominal pain.   Genitourinary: Negative for difficulty urinating.   Musculoskeletal: Negative for arthralgias and neck stiffness.   Skin: Negative for color change.   Neurological: Negative for headaches.   Psychiatric/Behavioral: Negative for confusion.   All other systems reviewed and are negative.        Physical Exam   BP: (!) 141/63  Pulse: 107  Temp: 97.9  F (36.6  C)  Resp: 18  SpO2: 100 %  Physical Exam  Vitals signs and nursing note reviewed.   Constitutional:       Comments: Alert elderly frail   HENT:      Head: Atraumatic.   Eyes:      Extraocular Movements: Extraocular movements intact.      Pupils: Pupils are equal, round, and reactive to light.   Neck:      Musculoskeletal: Neck supple.   Cardiovascular:      Comments: Slightly tachy  Pulmonary:      Breath sounds: Normal breath sounds. No wheezing or rales.   Abdominal:      Palpations: Abdomen is soft.      Comments: Obese but nontender   Genitourinary:     Comments: Perineal exam reveals clots in the patient's depends and maroon blood in her rectal vault.  Musculoskeletal:         General: No deformity.   Skin:     General: Skin is warm.   Neurological:      General: No focal deficit present.      Mental Status: She is oriented to person, place, and time.      Comments: Moves all extremities but seems slightly demented   Psychiatric:         Mood and Affect: Mood normal.         ED Course      Procedures          Patient was placed on cardiac monitor and oximetry.  IV was placed and blood was drawn.  EKG revealed a mostly regular tachycardia that appeared to be either atrial fibrillation with rapid ventricular response or a sinus rhythm with frequent PVCs.  Patient had a left bundle.  This is read by me personally.    Results for orders placed or performed during the hospital encounter of 02/21/21 (from the past 24 hour(s))   CBC with platelets differential   Result Value Ref Range    WBC 10.7 4.0 - 11.0 10e9/L    RBC Count 2.59 (L) 3.8 - 5.2 10e12/L    Hemoglobin 7.6 (L) 11.7 - 15.7 g/dL    Hematocrit 25.5 (L) 35.0 - 47.0 %    MCV 99 78 - 100 fl    MCH 29.3 26.5 - 33.0 pg    MCHC 29.8 (L) 31.5 - 36.5 g/dL    RDW 14.6 10.0 - 15.0 %    Platelet Count 463 (H) 150 - 450 10e9/L    Diff Method Automated Method     % Neutrophils 74.8 %    % Lymphocytes 16.1 %    % Monocytes 5.8 %    % Eosinophils 1.4 %    % Basophils 0.4 %    % Immature Granulocytes 1.5 %    Nucleated RBCs 0 0 /100    Absolute Neutrophil 8.0 1.6 - 8.3 10e9/L    Absolute Lymphocytes 1.7 0.8 - 5.3 10e9/L    Absolute Monocytes 0.6 0.0 - 1.3 10e9/L    Absolute Eosinophils 0.2 0.0 - 0.7 10e9/L    Absolute Basophils 0.0 0.0 - 0.2 10e9/L    Abs Immature Granulocytes 0.2 0 - 0.4 10e9/L    Absolute Nucleated RBC 0.0    INR   Result Value Ref Range    INR 2.90 (H) 0.86 - 1.14   Comprehensive metabolic panel   Result Value Ref Range    Sodium 140 133 - 144 mmol/L    Potassium 4.3 3.4 - 5.3 mmol/L    Chloride 108 94 - 109 mmol/L    Carbon Dioxide 26 20 - 32 mmol/L    Anion Gap 7 3 - 14 mmol/L    Glucose 143 (H) 70 - 99 mg/dL    Urea Nitrogen 31 (H) 7 - 30 mg/dL    Creatinine 0.78 0.52 - 1.04 mg/dL    GFR Estimate 69 >60 mL/min/[1.73_m2]    GFR Estimate If Black 80 >60 mL/min/[1.73_m2]    Calcium 8.7 8.5 - 10.1 mg/dL    Bilirubin Total 0.3 0.2 - 1.3 mg/dL    Albumin 2.1 (L) 3.4 - 5.0 g/dL    Protein Total 6.1 (L) 6.8 - 8.8 g/dL    Alkaline Phosphatase 139 40 - 150 U/L    ALT  9 0 - 50 U/L    AST 13 0 - 45 U/L   ABO/Rh type and screen   Result Value Ref Range    ABO PENDING     Antibody Screen PENDING     Test Valid Only At          [facility]    Specimen Expires 02/24/2021    EKG 12 lead   Result Value Ref Range    Interpretation ECG Click View Image link to view waveform and result      *Note: Due to a large number of results and/or encounters for the requested time period, some results have not been displayed. A complete set of results can be found in Results Review.     Patient was typed and crossed for 2 units packed cells and was given some Protonix as well.  At this time with her INR only 2.9 and her aortic valve, the patient will not have anticoagulation correction.  GI consult ordered and bed ordered for upstairs on the medicine service.    Medications   sodium chloride 0.9% infusion ( Intravenous New Bag 2/21/21 1430)   morphine (PF) injection 2 mg (2 mg Intravenous Given 2/21/21 1456)   pantoprazole (PROTONIX) IV push injection 40 mg (40 mg Intravenous Given 2/21/21 1434)     Orders Placed This Encounter   Procedures     XR Chest Port 1 View     CBC with platelets differential     INR     Comprehensive metabolic panel     Asymptomatic SARS-CoV-2 COVID-19 Virus (Coronavirus) by PCR     Troponin I     NT proBNP inpatient and ED     Nt probnp inpatient     Troponin I     EKG 12 lead     Peripheral IV: Standard     Cardiac Continuous Monitoring     GI LUMINAL ADULT IP CONSULT: Patient to be seen: ASAP within 4 hrs; Call back #: [phone]; GI bleed; Consultant may enter orders: Yes; Requesting provider? Attending physician; Name: ROXANNA Espinosa     ABO/Rh type and screen     Red blood cell prepare order unit     Admit to Inpatient       Chest x-ray pending         Assessments & Plan (with Medical Decision Making)     I have reviewed the nursing notes.    I have reviewed the findings, diagnosis, and plan with the patient.      Final  diagnoses:   Gastrointestinal hemorrhage, unspecified gastrointestinal hemorrhage type   Atrial fibrillation with rapid ventricular response (H)   Chronic anticoagulation - s/p AoVR   DNR (do not resuscitate) - DNR/DNI from Polst form     Chris Bradford MD, MD      IToby, am serving as a trained medical scribe to document services personally performed by Chris Bradford MD, based on the provider's statements to me.      I, Chris Bradford MD, was physically present and have reviewed and verified the accuracy of this note documented by Toby Dempsey.  --  Chris Bradford MD  [facility] EMERGENCY DEPARTMENT  2/21/2021     Chris Bradford MD  02/21/21 [id number]

## 2021-02-21 NOTE — CONSULTS
GASTROENTEROLOGY CONSULTATION      Date of Admission:  2/21/2021           Reason for Consultation:   We were asked by Dr. Bradford of the ED to evaluate this patient with rectal bleeding           ASSESSMENT AND RECOMMENDATIONS:   Assessment:  Louisa Albarran is a 86 year old female with a history of [place]'s disease, dementia, mechanical aortic valve repair, CABG, and ICD placement in 2005 on warfarin, HTN, cholecysto-duodenostomy with stent for cholecystitis in 2016, also a hx of diverticulosis, hemorrhoids, and polyps c/w hamartomas on colonoscopy in 2007 who is presenting with rectal bleeding.     Source is likely lower GI in nature. We do not have a Hgb check more recent than 2016, so it is unclear how much she's dropped. Ddx is hemorrhoids, AVM's given her hx of AVR, diverticulosis (although presentation less c/w this), and bleeding from a hamartoma/polyp. Given that the patient is not having an active GI bleed causing hemodynamic instability, we would favor monitoring the patient clinically. Additionally, a colonoscopy at this time would likely be low yield.      Recommendations  - Appreciate nursing documentation of stool caliber   - Continue to monitor Hgb  - Continue IV PPI for now     Thank you for involving us in this patient's care. Please do not hesitate to contact the GI service with any questions or concerns.     Pt care plan discussed with Dr. Rahman, GI staff physician.    Dena Houston MD  -------------------------------------------------------------------------------------------------------------------           History of Present Illness:   Louisa Albarran is a 86 year old female with a history of [place]'s disease, dementia, mechanical aortic valve repair, CABG, and ICD placement in 2005 on warfarin, HTN, cholecysto-duodenostomy with stent for cholecystitis in 2016, also a hx of diverticulosis, hemorrhoids, and polyps c/w hamartomas on colonoscopy in 2007 who is presenting  with rectal bleeding.     The patient is a poor historian and cannot tell me why she is here. Per chart review, the patient was found to have clots in her briefs this AM at her nursing home.     In the ED, she was tachycardic to the 110's w/ blood pressure in the 140's systolically. Hgb was 7.6 (last check in 2016 was 9.3), INR 2.9. Per d/w ED doctor, his rectal exam revealed maroon blood in the rectal vault and clots in her briefs (patient refused my rectal exam). I was unable to reach the patients son who is listed as her .           Data:   N/a            Previous Endoscopy:   Procedure:      Colonoscopy 1/20/2007  Indications:    Hematochezia   Impression:     - Perianal skin tags.                   - Blood in the sigmoid colon, in the descending colon and in                   the transverse colon.                   - Diverticulosis.                   - Internal, non bleeding, medium hemorrhoids were found. Not                   the source of this amount of bleeding.                   - Multiple small polyps in the sigmoid colon and in the                   transverse colon, appearance consistent with hamartomas. All                   polyp tissue remains intact.  Recommendation: - Bleeding was likely diverticular. If patients shows signs                   of rebleeding, then there is NO role for repeat colonoscopy.                   Would consider tagged RBC scan vs angiography.                   - If rebleeds, would also consider surgical consultation for                   consideration of sigmoid resection for recurrent diverticular                   bleeding.           Medications:   (Not in a hospital admission)            Physical Exam:   /50   Pulse 107   Temp 97.9  F (36.6  C) (Oral)   Resp 18   SpO2 100%   Wt:   Wt Readings from Last 2 Encounters:   05/24/16 83.3 kg (183 lb 11.2 oz)   05/20/16 83.3 kg (183 lb 11.2 oz)      Constitutional: NAD, on RA  Eyes: conjunctiva  anicteric  CV: No edema  Respiratory: Unlabored breathing  Abd: nondistended, +bs, nontender, no peritoneal signs  Skin: warm, perfused, no jaundice  Neuro: fluent speech, not oriented to place, situation, or time   Psych: Normal affect  MSK: No gross deformities          Past Medical History:   Reviewed and edited as appropriate  Past Medical History:   Diagnosis Date     Acute cholecystitis     w/ severe sepsis and choledocholithiasis     Antiplatelet or antithrombotic long-term use      Aortic stenosis     s/p valve replacement     Breast cancer (H) 1982     CAD (coronary artery disease)      Cowden syndrome (H)      Diastolic dysfunction      DM type 2 (diabetes mellitus, type 2) (H)      Generalized anxiety disorder      GERD (gastroesophageal reflux disease)      Hyperlipidemia      Hypertension      Hypothyroidism     s/p thyroidectomy     LBBB (left bundle branch block)      Ovarian ca (H) 1981     Right renal mass      Skin cancer      Thyrotoxicosis without mention of goiter or other cause, without mention of thyrotoxic crisis or storm 2001    thyroidectomy            Past Surgical History:   Reviewed and edited as appropriate   Past Surgical History:   Procedure Laterality Date     AORTIC VALVE REPLACEMENT  8/2005    [facility]     CORONARY ARTERY BYPASS  8/2005     ENDOSCOPIC RETROGRADE CHOLANGIOPANCREATOGRAM N/A 1/20/2016    Procedure: COMBINED ENDOSCOPIC RETROGRADE CHOLANGIOPANCREATOGRAPHY, PLACE TUBE/STENT;  Surgeon: Adair Hood MD;  Location: [facility] OR     ENDOSCOPIC RETROGRADE CHOLANGIOPANCREATOGRAM N/A 4/18/2016    Procedure: COMBINED ENDOSCOPIC RETROGRADE CHOLANGIOPANCREATOGRAPHY, REMOVE FOREIGN BODY OR STENT/TUBE;  Surgeon: Adair Hood MD;  Location: [facility] OR     ENDOSCOPIC ULTRASOUND UPPER GASTROINTESTINAL TRACT (GI) N/A 5/4/2016    Procedure: ENDOSCOPIC ULTRASOUND, ESOPHAGOSCOPY / UPPER GASTROINTESTINAL TRACT (GI);  Surgeon: Adair Hood MD;  Location: [facility] OR     HYSTERECTOMY,  VAGINAL  1981     MASTECTOMY, SIMPLE RT/LT/GENARO Right 1982     SALPINGO OOPHORECTOMY,R/L/GENARO  1981     THYROIDECTOMY   2001            Social History:   Living situation: lives in a nursing home         Family History:   Reviewed and edited as appropriate  Family History   Problem Relation Age of Onset     Cancer Sister         ?ovarian cancer     Respiratory Brother       No known history of colorectal cancer, liver disease, or inflammatory bowel disease.         Allergies:   Reviewed and edited as appropriate     Allergies   Allergen Reactions     Ciprofloxacin Other (See Comments)     Phlebitis when given IV              Review of Systems:     A complete 10 point review of systems was performed and is negative except as noted in the HPI

## 2021-02-21 NOTE — ED TRIAGE NOTES
"Patient BIBA from care facility due to staff reports of two \"apple sized\" clots in her brief this morning. Patient is currently on coumadin.   "

## 2021-02-21 NOTE — ED NOTES
[facility]   ED Nurse to Floor Handoff     Louisa Albarran is a 86 year old female who speaks English and lives with others,  in a nursing home  They arrived in the ED by ambulance from nursing facility.    ED Chief Complaint: Rectal Bleeding    ED Dx;   Final diagnoses:   Gastrointestinal hemorrhage, unspecified gastrointestinal hemorrhage type   Atrial fibrillation with rapid ventricular response (H)   Chronic anticoagulation - s/p AoVR   DNR (do not resuscitate) - DNR/DNI from Pol[facility] form         Needed?: No    Allergies:   Allergies   Allergen Reactions     Ciprofloxacin Other (See Comments)     Phlebitis when given IV   .  Past Medical Hx:   Past Medical History:   Diagnosis Date     Acute cholecystitis     w/ severe sepsis and choledocholithiasis     Antiplatelet or antithrombotic long-term use      Aortic stenosis     s/p valve replacement     Breast cancer (H) 1982     CAD (coronary artery disease)      Cowden syndrome (H)      Diastolic dysfunction      DM type 2 (diabetes mellitus, type 2) (H)      Generalized anxiety disorder      GERD (gastroesophageal reflux disease)      Hyperlipidemia      Hypertension      Hypothyroidism     s/p thyroidectomy     LBBB (left bundle branch block)      Ovarian ca (H) 1981     Right renal mass      Skin cancer      Thyrotoxicosis without mention of goiter or other cause, without mention of thyrotoxic crisis or storm 2001    thyroidectomy      Baseline Mental status: other Patient is alert and oriented, but forgetful.   Current Mental Status changes: at basesline    Infection present or suspected this encounter: no  Sepsis suspected: No  Isolation type: Contact  Patient tested for COVID 19 prior to admission: YES     Activity level - Baseline/Home:  Wheelchair and EZ stand.  Activity Level - Current:   Total Care    Bariatric equipment needed?: No    In the ED these meds were given:   Medications   sodium  chloride 0.9% infusion ( Intravenous New Bag 2/21/21 1430)   morphine (PF) injection 2 mg (2 mg Intravenous Given 2/21/21 1456)   pantoprazole (PROTONIX) IV push injection 40 mg (40 mg Intravenous Given 2/21/21 1434)       Drips running?  Yes IV maintenance fluid.    Home pump  No    Current LDAs  Peripheral IV 02/21/21 Left Lower forearm (Active)   Site Assessment WDL 02/21/21 1357   Line Status Saline locked 02/21/21 1357   Dressing Intervention New dressing  02/21/21 1357   Phlebitis Scale 0-->no symptoms 02/21/21 1357   Infiltration Scale 0 02/21/21 1357   Number of days: 0       ETT (adult) 7 (Active)   Number of days: 1754       Closed/Suction Drain 1 Right;Lateral Abdomen Other (Comment) 8 [place] LOT: [id number] (Active)   Number of days: 1858       Open Drain Right Abdomen pre-existing biliary drain (Active)   Number of days:        T-Tube dependent drainage;to leg bag (Active)   Number of days:        Pressure Ulcer 02/03/16 Bilateral Buttocks (Active)   Number of days: 1845       Wound Posterior;Right Buttocks Skin tear (Active)   Number of days:        Wound 02/21/16 Right;Lower Abdomen intact blood blister (Active)   Number of days: 1827       Wound Right Flank (Active)   Number of days:        Labs results:   Labs Ordered and Resulted from Time of ED Arrival Up to the Time of Departure from the ED   CBC WITH PLATELETS DIFFERENTIAL - Abnormal; Notable for the following components:       Result Value    RBC Count 2.59 (*)     Hemoglobin 7.6 (*)     Hematocrit 25.5 (*)     MCHC 29.8 (*)     Platelet Count 463 (*)     All other components within normal limits   INR - Abnormal; Notable for the following components:    INR 2.90 (*)     All other components within normal limits   COMPREHENSIVE METABOLIC PANEL - Abnormal; Notable for the following components:    Glucose 143 (*)     Urea Nitrogen 31 (*)     Albumin 2.1 (*)     Protein Total 6.1 (*)     All other components within normal limits   NT PROBNP  INPATIENT   TROPONIN I   SARS-COV-2 (COVID-19) VIRUS RT-PCR   PERIPHERAL IV CATHETER   CARDIAC CONTINUOUS MONITORING   ABO/RH TYPE AND SCREEN   RED BLOOD CELL PREPARE ORDER UNIT       Imaging Studies: No results found for this or any previous visit (from the past 24 hour(s)).    Recent vital signs:   /50   Pulse 107   Temp 97.9  F (36.6  C) (Oral)   Resp 18   SpO2 100%     Rafael Coma Scale Score: 15 (02/21/21 1359)       Cardiac Rhythm: A fib  Pt needs tele? No  Skin/wound Issues: Bruising and petechiae. Pressure ulcers on bottom.    Code Status: Full Code    Pain control: fair    Nausea control: pt had none    Abnormal labs/tests/findings requiring intervention: Hgb, INR, see results    Family present during ED course? No   Family Comments/Social Situation comments: n/a    Tasks needing completion: None    Consuelo Bartlett, RN  [phone] [facility] ED

## 2021-02-21 NOTE — ED NOTES
Attempted to call son Keshav at the mobile number listed multiple times but the phone number was busy.  No message could be left.    Chris Bradford MD, MD Bradford, Chris Tang MD  02/21/21 [phone]

## 2021-02-21 NOTE — PHARMACY-ADMISSION MEDICATION HISTORY
Admission Medication History Completed by Pharmacy    See [facility] Admission Navigator for allergy information, preferred outpatient pharmacy, prior to admission medications and immunization status.     Medication History Sources:     MAR from [facility]    Changes made to PTA medication list (reason):    Added:  o Vitamin D3 5,000 unit tablet -- take 1 tablet daily per MAR)  o Atorvastatin 10mg tablet -- take 1 tablet daily (per MAR)  o Oxycodone 10mg 12hr tablet -- take 1 tablet q12h (per MAR)  o Oxycodone 5mg tablet -- take 5mg q6h prn (per MAR)  o Omeprazole DR 10mg capsule -- take 10mg daily (per MAR)  o ProSource 10g-100kcal/30mL oral liquid -- take 30mL bid (per MAR)  o Warfarin 3mg tablet -- take 1 tablet daily starting 2/20/21 until 2/25/21 (per MAR)  o Amoxicillin 500mg capsule -- take 2,000mg 60 minutes before dental appt & cortisone injections (per MAR)  o Benzocaine 10% gel -- apply thin layer topically to right lateral tongue 3 times daily as needed (per MAR)    Deleted:  o Albuterol 2.5mg/3mL nebulizer solution (not on MAR)  o Calcium citrate-vitamin D (not on MAR)  o Esomeprazole (not on MAR)  o Ferrous sulfate (not on MAR)  o Ipratropium-albuterol nebulizer solution (not on MAR)  o Melatonin 1mg tablets (not on MAR)  o Quetiapine 25mg tablet (not on MAR)    Changed:  o Atorvastatin 80mg tablet daily --> 10mg tablet daily (per MAR)  o Furosemide 40mg tablet daily --> 20mg tablet daily (per MAR)  o Acetaminophen 325mg tablet (2 tablets q6h prn) --> acetaminophen 500mg tablet (2 tablets tid) (per MAR)  o Levothyroxine 175mcg tablet daily --> 150mcg tablet daily (per MAR)    Additional Information:    Received patient's MAR from [facility].      Prior to Admission medications    Medication Sig Last Dose Taking? Auth Provider   acetaminophen (TYLENOL) 500 MG tablet Take 1,000 mg by mouth 3 times daily At 0600, 1000, & 1600 2/21/2021 at 1000 Yes Unknown, Entered By History    allopurinol (ZYLOPRIM) 100 MG tablet Take 1 tablet (100 mg) by mouth daily 2/21/2021 at 0800 Yes Bib Jones MD   amoxicillin (AMOXIL) 500 MG capsule Take 2,000 mg by mouth daily Give 60 minutes before dental appointments and cortisone injections Unknown Yes Unknown, Entered By History   atorvastatin (LIPITOR) 10 MG tablet Take 10 mg by mouth daily 2/21/2021 at 0800 Yes Unknown, Entered By History   benzocaine (ANBESOL) 10 % gel Apply thin layer topically to right lateral tongue 3 times daily as needed Unknown Yes Unknown, Entered By History   furosemide (LASIX) 20 MG tablet Take 20 mg by mouth daily 2/21/2021 at 0800 Yes Unknown, Entered By History   hypromellose (ARTIFICIAL TEARS) 0.4 % SOLN Place 1 drop into both eyes every hour as needed for dry eyes Unknown Yes Bib Jones MD   levothyroxine (SYNTHROID/LEVOTHROID) 150 MCG tablet Take 150 mcg by mouth daily 2/21/2021 at 0600 Yes Unknown, Entered By History   metoprolol (LOPRESSOR) 50 MG tablet Take 1 tablet (50 mg) by mouth 2 times daily 2/21/2021 at 0800 Yes Bib Jones MD   Nutritional Supplements (PROSOURCE) LIQD Take 30mL of 10g-100kcal/30mL oral liquid by mouth twice daily at 0900 & 1500 2/21/2021 at 0900 Yes Unknown, Entered By History   omeprazole (PRILOSEC) 10 MG DR capsule Take 10 mg by mouth daily 2/21/2021 at 0800 Yes Unknown, Entered By History   oxyCODONE (OXYCONTIN) 10 MG 12 hr tablet Take 10 mg by mouth every 12 hours 2/21/2021 at 0800 Yes Unknown, Entered By History   oxyCODONE (ROXICODONE) 5 MG tablet Take 5 mg by mouth every 6 hours as needed for severe pain 2/21/2021 at 0800 Yes Unknown, Entered By History   potassium chloride (K-DUR) 10 MEQ tablet Take 1 tablet (10 mEq) by mouth daily 2/20/2021 at 1700 Yes Gustavo Shepard APRN CNP   senna-docusate (SENOKOT-S;PERICOLACE) 8.6-50 MG per tablet Take 1-2 tablets by mouth 2 times daily as needed for constipation  Patient taking differently: Take 2 tablets by mouth 2  times daily At 0800 & 1700 2/21/2021 at 0800 Yes Bib Jones MD   Vitamin D3 (CHOLECALCIFEROL) 125 MCG (5000 UT) tablet Take 1 tablet by mouth daily 2/21/2021 at 0800 Yes Unknown, Entered By History   warfarin ANTICOAGULANT (COUMADIN) 3 MG tablet Take 3 mg by mouth daily Until 2/25/21 2/20/2021 at 2000 Yes Unknown, Entered By History   BLOOD GLUCOSE METER KIT PER PATIENT HEALTH PLAN   Kerry Perez MD   blood glucose monitoring (NO BRAND SPECIFIED) test strip Once daily or as directed   KHRIS Jeter MD   ONE TOUCH LANCETS [facility] [place] [facility] As directed.   KHRIS Jeter MD   ORDER FOR DME Equipment being ordered: Standard walker with 2 wheels on the fron   KHRIS Jeter MD   ORDER FOR DME Equipment being ordered: Wrap Around hinged knee brace   Dimitrios Price, DO           Date completed: 02/21/21    Medication history completed by: Eulalia Borrego

## 2021-02-22 LAB
ANION GAP SERPL CALCULATED.3IONS-SCNC: 6 MMOL/L (ref 3–14)
BUN SERPL-MCNC: 34 MG/DL (ref 7–30)
CALCIUM SERPL-MCNC: 8 MG/DL (ref 8.5–10.1)
CHLORIDE SERPL-SCNC: 115 MMOL/L (ref 94–109)
CO2 SERPL-SCNC: 23 MMOL/L (ref 20–32)
CREAT SERPL-MCNC: 0.89 MG/DL (ref 0.52–1.04)
ERYTHROCYTE [DISTWIDTH] IN BLOOD BY AUTOMATED COUNT: 15.7 % (ref 10–15)
GFR SERPL CREATININE-BSD FRML MDRD: 59 ML/MIN/{1.73_M2}
GLUCOSE BLDC GLUCOMTR-MCNC: 108 MG/DL (ref 70–99)
GLUCOSE BLDC GLUCOMTR-MCNC: 150 MG/DL (ref 70–99)
GLUCOSE BLDC GLUCOMTR-MCNC: 179 MG/DL (ref 70–99)
GLUCOSE SERPL-MCNC: 120 MG/DL (ref 70–99)
HCT VFR BLD AUTO: 27.9 % (ref 35–47)
HGB BLD-MCNC: 7.9 G/DL (ref 11.7–15.7)
HGB BLD-MCNC: 8.8 G/DL (ref 11.7–15.7)
INR PPP: 2.72 (ref 0.86–1.14)
INTERPRETATION ECG - MUSE: NORMAL
INTERPRETATION ECG - MUSE: NORMAL
MCH RBC QN AUTO: 30.3 PG (ref 26.5–33)
MCHC RBC AUTO-ENTMCNC: 31.5 G/DL (ref 31.5–36.5)
MCV RBC AUTO: 96 FL (ref 78–100)
PLATELET # BLD AUTO: 324 10E9/L (ref 150–450)
POTASSIUM SERPL-SCNC: 4 MMOL/L (ref 3.4–5.3)
RBC # BLD AUTO: 2.9 10E12/L (ref 3.8–5.2)
SODIUM SERPL-SCNC: 145 MMOL/L (ref 133–144)
WBC # BLD AUTO: 10.3 10E9/L (ref 4–11)

## 2021-02-22 PROCEDURE — C9113 INJ PANTOPRAZOLE SODIUM, VIA: HCPCS | Performed by: STUDENT IN AN ORGANIZED HEALTH CARE EDUCATION/TRAINING PROGRAM

## 2021-02-22 PROCEDURE — 85018 HEMOGLOBIN: CPT | Performed by: STUDENT IN AN ORGANIZED HEALTH CARE EDUCATION/TRAINING PROGRAM

## 2021-02-22 PROCEDURE — 120N000002 HC R&B MED SURG/OB UMMC

## 2021-02-22 PROCEDURE — 36415 COLL VENOUS BLD VENIPUNCTURE: CPT | Performed by: STUDENT IN AN ORGANIZED HEALTH CARE EDUCATION/TRAINING PROGRAM

## 2021-02-22 PROCEDURE — 85027 COMPLETE CBC AUTOMATED: CPT | Performed by: STUDENT IN AN ORGANIZED HEALTH CARE EDUCATION/TRAINING PROGRAM

## 2021-02-22 PROCEDURE — 250N000011 HC RX IP 250 OP 636: Performed by: STUDENT IN AN ORGANIZED HEALTH CARE EDUCATION/TRAINING PROGRAM

## 2021-02-22 PROCEDURE — 99232 SBSQ HOSP IP/OBS MODERATE 35: CPT | Performed by: NURSE PRACTITIONER

## 2021-02-22 PROCEDURE — 99232 SBSQ HOSP IP/OBS MODERATE 35: CPT | Mod: GC | Performed by: STUDENT IN AN ORGANIZED HEALTH CARE EDUCATION/TRAINING PROGRAM

## 2021-02-22 PROCEDURE — 85610 PROTHROMBIN TIME: CPT | Performed by: STUDENT IN AN ORGANIZED HEALTH CARE EDUCATION/TRAINING PROGRAM

## 2021-02-22 PROCEDURE — 80048 BASIC METABOLIC PNL TOTAL CA: CPT | Performed by: STUDENT IN AN ORGANIZED HEALTH CARE EDUCATION/TRAINING PROGRAM

## 2021-02-22 PROCEDURE — 250N000013 HC RX MED GY IP 250 OP 250 PS 637: Performed by: STUDENT IN AN ORGANIZED HEALTH CARE EDUCATION/TRAINING PROGRAM

## 2021-02-22 PROCEDURE — 999N000128 HC STATISTIC PERIPHERAL IV START W/O US GUIDANCE

## 2021-02-22 PROCEDURE — 999N001017 HC STATISTIC GLUCOSE BY METER IP

## 2021-02-22 RX ORDER — METOPROLOL TARTRATE 25 MG/1
25 TABLET, FILM COATED ORAL 2 TIMES DAILY
Status: DISCONTINUED | OUTPATIENT
Start: 2021-02-22 | End: 2021-02-23

## 2021-02-22 RX ORDER — FUROSEMIDE 20 MG
20 TABLET ORAL DAILY
Status: DISCONTINUED | OUTPATIENT
Start: 2021-02-22 | End: 2021-02-24 | Stop reason: HOSPADM

## 2021-02-22 RX ORDER — PANTOPRAZOLE SODIUM 40 MG/1
40 TABLET, DELAYED RELEASE ORAL
Status: DISCONTINUED | OUTPATIENT
Start: 2021-02-23 | End: 2021-02-24 | Stop reason: HOSPADM

## 2021-02-22 RX ORDER — POLYETHYLENE GLYCOL 3350 17 G/17G
17 POWDER, FOR SOLUTION ORAL DAILY
Status: DISCONTINUED | OUTPATIENT
Start: 2021-02-22 | End: 2021-02-24 | Stop reason: HOSPADM

## 2021-02-22 RX ADMIN — METOPROLOL TARTRATE 25 MG: 25 TABLET, FILM COATED ORAL at 12:42

## 2021-02-22 RX ADMIN — POLYETHYLENE GLYCOL 3350 17 G: 17 POWDER, FOR SOLUTION ORAL at 14:58

## 2021-02-22 RX ADMIN — ACETAMINOPHEN 1000 MG: 500 TABLET, FILM COATED ORAL at 14:58

## 2021-02-22 RX ADMIN — ALLOPURINOL 100 MG: 100 TABLET ORAL at 08:08

## 2021-02-22 RX ADMIN — FUROSEMIDE 20 MG: 20 TABLET ORAL at 12:42

## 2021-02-22 RX ADMIN — Medication 1 MG: at 21:58

## 2021-02-22 RX ADMIN — OXYCODONE HYDROCHLORIDE 5 MG: 5 TABLET ORAL at 21:59

## 2021-02-22 RX ADMIN — Medication 125 MCG: at 08:08

## 2021-02-22 RX ADMIN — OXYCODONE HYDROCHLORIDE 10 MG: 10 TABLET, FILM COATED, EXTENDED RELEASE ORAL at 21:59

## 2021-02-22 RX ADMIN — METOPROLOL TARTRATE 25 MG: 25 TABLET, FILM COATED ORAL at 20:02

## 2021-02-22 RX ADMIN — ATORVASTATIN CALCIUM 10 MG: 10 TABLET, FILM COATED ORAL at 08:07

## 2021-02-22 RX ADMIN — OXYCODONE HYDROCHLORIDE 10 MG: 10 TABLET, FILM COATED, EXTENDED RELEASE ORAL at 10:23

## 2021-02-22 RX ADMIN — LEVOTHYROXINE SODIUM 150 MCG: 150 TABLET ORAL at 08:08

## 2021-02-22 RX ADMIN — ACETAMINOPHEN 1000 MG: 500 TABLET, FILM COATED ORAL at 19:59

## 2021-02-22 RX ADMIN — PANTOPRAZOLE SODIUM 40 MG: 40 INJECTION, POWDER, FOR SOLUTION INTRAVENOUS at 10:33

## 2021-02-22 RX ADMIN — ACETAMINOPHEN 1000 MG: 500 TABLET, FILM COATED ORAL at 08:08

## 2021-02-22 ASSESSMENT — ACTIVITIES OF DAILY LIVING (ADL)
ADLS_ACUITY_SCORE: 17
ADLS_ACUITY_SCORE: 25

## 2021-02-22 NOTE — PROGRESS NOTES
GASTROENTEROLOGY PROGRESS NOTE          ASSESSMENT AND RECOMMENDATIONS:   Assessment:  Louisa Albarran is a 86 year old female with a history of [place]'s disease, dementia, mechanical aortic valve repair, CABG, and ICD placement in 2005 on warfarin, HTN, cholecysto-duodenostomy with stent for cholecystitis in 2016, also a hx of diverticulosis, hemorrhoids, and polyps c/w hamartomas on colonoscopy in 2007 who is presenting with rectal bleeding.     #Acute normocytic anemia  #BRBPR  #Anticoagulation   Mild tachycardia, otherwise VSS. No orthostasis or dizziness. Two unit(s) PRBC last night brought hgb from 7.6 >>>>> 9.2, 8.8 now this. No further signs of blood per rectum. Bleeding likely d/t previously noted internal hemorrhoids, but could also be related to colon polyp/mass, less likely diverticulosis. She is hemodynamically stable without further signs of bleeding. However, given the need for continued anticoagulation it is certainly likely her source of bleeding will bleed again in the future. Pending goals of care discussion, would consider inpatient colonoscopy to further evaluate.    Recommendations  -ADAT (unless goals of care discussion favors colonoscopy this admission, then keep on clears, prep for colonoscopy this pm, will await input from primary team)  -Ok to switch to daily PO PPI or discontinue   -Consider daily Miralax to aid in soft bowel movements without straining     GI will continue to follow. Thank you for involving us in this patient's care. Please do not hesitate to contact the GI service with any questions or concerns.     Pt care plan discussed with Dr. Rahman, GI staff physician.      Keshav Osuna [facility]  GI L[facility]  Text page          Interval History:   Hungry. Feeling well. No bm since admit yesterday. No noted BRBPR. She denies nausea, abdominal pain, dizziness, and dyspnea.         Medications:     Current Facility-Administered Medications   Medication     acetaminophen (TYLENOL)  tablet 1,000 mg     allopurinol (ZYLOPRIM) tablet 100 mg     atorvastatin (LIPITOR) tablet 10 mg     benzocaine (ORAJEL MAXIMUM STRENGTH) 20 % gel     glucose gel 15-30 g    Or     dextrose 50 % injection 25-50 mL    Or     glucagon injection 1 mg     ipratropium - albuterol 0.5 mg/2.5 mg/3 mL (DUONEB) neb solution 3 mL     levothyroxine (SYNTHROID/LEVOTHROID) tablet 150 mcg     lidocaine (LMX4) cream     lidocaine 1 % 0.1-1 mL     melatonin tablet 1 mg     morphine (PF) injection 2 mg     ondansetron (ZOFRAN-ODT) ODT tab 4 mg    Or     ondansetron (ZOFRAN) injection 4 mg     oxyCODONE (oxyCONTIN) 12 hr tablet 10 mg     oxyCODONE (ROXICODONE) tablet 5 mg     pantoprazole (PROTONIX) IV push injection 40 mg     senna-docusate (SENOKOT-S/PERICOLACE) 8.6-50 MG per tablet 1-2 tablet     sodium chloride (PF) 0.9% PF flush 3 mL     sodium chloride (PF) 0.9% PF flush 3 mL     sodium chloride 0.9% infusion     Vitamin D3 (CHOLECALCIFEROL) tablet 125 mcg        Physical Exam   Blood pressure (!) 167/67, pulse 82, temperature 97.4  F (36.3  C), temperature source Oral, resp. rate 18, SpO2 97 %.  Constitutional: cooperative, pleasant, not dyspneic/diaphoretic, no acute distress  Eyes: Sclera anicteric/injected  Ears/nose/mouth/throat: Normal oropharynx without ulcers or exudate, mucus membranes moist  Neck: supple  CV: +1 LLE  Respiratory: Unlabored breathing  Abd: Nondistended, +bs, no hepatosplenomegaly, nontender, no peritoneal signs  Skin: warm, perfused, no jaundice  Neuro: AAO x 3  Psych: Normal affect  MSK: No gross deformities    Data   Current Labs  CBC  Recent Labs   Lab 02/21/21  2310 02/21/21  1356   WBC  --  10.7   RBC  --  2.59*   HGB 9.2* 7.6*   HCT  --  25.5*   MCV  --  99   MCH  --  29.3   MCHC  --  29.8*   RDW  --  14.6   PLT  --  463*     BMP  Recent Labs   Lab 02/21/21  2310 02/21/21  1356   NA  --  140   POTASSIUM 4.2 4.3   CHLORIDE  --  108   CO2  --  26   ANIONGAP  --  7   GLC  --  143*   BUN  --  31*    CR  --  0.78   GFRESTIMATED  --  69   GFRESTBLACK  --  80   BRINDA  --  8.7      INR  Recent Labs   Lab 02/21/21  1356   INR 2.90*     Liver panel  Recent Labs   Lab 02/21/21  1356   PROTTOTAL 6.1*   ALBUMIN 2.1*   BILITOTAL 0.3   ALKPHOS 139   AST 13   ALT 9       Imaging/procedures:  Reviewed in EMR

## 2021-02-22 NOTE — CONSULTS
Care Management Initial Consult    General Information  Assessment completed with: VM-chart review,    Type of CM/SW Visit: Initial Assessment  Primary Care Provider verified and updated as needed: Yes   Readmission within the last 30 days: no previous admission in last 30 days   Return Category: New Diagnosis     Reason for consult: elevated risk score and return to LTC.  Advance Care Planning: Advance Care Planning Reviewed: present on chart       Communication Assessment  Patient's communication style: spoken language (English or Bilingual)    Hearing Difficulty or Deaf: no   Wear Glasses or Blind: no    Cognitive  Cognitive/Neuro/Behavioral: .WDL except  Level of Consciousness: confused  Arousal Level: opens eyes spontaneously  Orientation: disoriented to, time  Mood/Behavior: labile, agitated     Speech: clear    Living Environment:   People in home: Lives with others in nursing home facility   Current living Arrangements: [facility]   Able to return to prior arrangements: yes     Family/Social Support:  Care provided by: other (see comments)  Provides care for: no one  Marital Status:   Support System: Adult son Keshav, friend Clare  Description of Support System: Supportive    Support Assessment: Adequate family and caregiver support, Adequate social supports    Current Resources:   Patient receiving home care services:  N/A  Community Resources:  N/A  Equipment currently used at home: lift device, wheelchair, manual  Supplies currently used at home: N/A    Employment/Financial:  Employment Status: retired     Financial Concerns: No concerns identified     Lifestyle & Psychosocial Needs:        Socioeconomic History     Marital status:      Spouse name: Not on file     Number of children: Not on file     Years of education: Not on file     Highest education level: Not on file   Occupational History     Employer: RETIRED     Tobacco Use     Smoking status: Never Smoker      Smokeless tobacco: Never Used   Substance and Sexual Activity     Alcohol use: No     Drug use: No     Sexual activity: Never       Functional Status:  Prior to admission patient needed assistance:   Pt uses a mechanical lift and w/c at baseline.   ADLS: difficulty with ambulation, bathing/dressing, transferring, and requires equipment for tasks. Pt also requires toileting assistance.  IADLS: Previously doing errands independently.    Mental Health Status:  Mental Health Status: No Current Concerns       Chemical Dependency Status:  Chemical Dependency Status: No Current Concerns           Values/Beliefs:  Spiritual, Cultural Beliefs, Faith Practices, Values that affect care: Pt identifies as Worship    Length of Stay (days): 1    Expected Discharge Date:  Unknown  Concerns to be Addressed: discharge planning     Patient plan of care discussed at interdisciplinary rounds: No    Anticipated Discharge Disposition: Long Term [facility] and [address]  [address]  P: [phone]  F: [phone]     Anticipated Discharge Services: None  Anticipated Discharge DME: None    Patient/family educated on Medicare website which has current facility and service quality ratings: Returning to prior living at [facility]  Education Provided on the Discharge Plan: yes    Patient/Family in Agreement with the Plan: yes    Referrals Placed by CM/ASHKAN:  N/A  Private pay costs discussed: Not applicable    Additional Information:  Pt is a 86 year old woman presenting to [facility] with rectal bleeding per nursing facility staff. Pt described as poor historian d/t forgetfulness and PMHx of dementia. Per staff, unable to reach son Keshav on mobile number listed in file. ASHKAN to inquire with nursing staff.    ASHKAN called [facility] [phone] to inquire about pts functional status, mental status, family contact info and if pt has a bed hold. ASHKAN spoke with Alondra in admissions who noted that pt resides  in LTC and can return upon discharge from hospital. SW attempted to speak with nursing to understand pts baseline and no answer. SW to call back to discuss with staff.    SW following for return to LTC.    KLEBER Seymour, [address] Acute Care   PH: [phone]  Pager: [phone]  [facility]

## 2021-02-22 NOTE — PROGRESS NOTES
Resident/Fellow Attestation   I, Bárbara Woods, was present with the medical/LUCILLE student who participated in the service and in the documentation of the note.  I have verified the history and personally performed the physical exam and medical decision making.  I agree with the assessment and plan of care as documented in the note.      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 Had 2 units of pRBC transfusion yesterday from ED.  GI consulted on admission, Dr. Rahman spoke to attending physician Dr. Bryan, and recommended goals of care discussion with son Keshav. Student Doctor spoke to sade Parr over the phone who plans to visit tomorrow. INR remains therapeutic at 2.72 (goal 2.5-3.5 for mechanical aortic valve). Will continue to hold warfarin today. Will have to involve family in discussion of GOC, whether to continue AC in the setting of possible GIB. Plan to discharge back to nursing home once stable Hgb without more melena/BRBPR.    Patient had melena this PM. Will recheck Hgb this PM.     Bárbara Woods MD  PGY1  Date of Service (when I saw the patient): 02/22/21    [facility]    Progress Note - Maroon 2 Service        Date of Admission:  2/21/2021    Assessment & Plan       Louisa Albarran is a 86 year old female admitted on 2/21/2021. She has a history of dementia, DM II, CAD, diastolic dysfunction, HTN, Romario's disease, diverticulosis, hemorrhoids, polyps with hamartomas found on colonoscopy (2007), mechanical aortic valve on warfarin, septic shock 2/2 cholangitis, dementia, and a-fib presenting with rectal bleeding, currently hemodynamically stable     # painless hematochezia  # concern for lower GI bleed  # hx hamartomas on colonoscopy  Ddx for a source of bleeding includes diverticulosis, hemorrhoids, hamartoma, angiodysplasia, or  malignancy. BUN/Cr ratio of 40 on presentation suggests possible fast upper GI bleed w/increased absorption of globin proteins, although hematochezia, clotted blood, and patient history suggest lower GI source is likely source. Patient is hemodynamically stable. INR therapeutic 2.72   -GI following, appreciate recs              > Will hold off on inpatient colonoscopy at this time, son is visiting  tomorrow and will have a more clear picture of goals of care then.   -s/p transfusion of 2 unit(s) PRBCs in the ED  -CLD, advance as tolerated  -discontinued IVF  -Switched from PPI IV BID to PO PPI once daily  -trend hemoglobin, INR  -hold PTA warfarin today, reassess INR tomorrow AM  -If hemodynamically unstable, consider reversal then start heparin gtt  - pain control: PTA tylenol 1g TID, PTA oxycodone 10mg q12h, oxycodone 5mg q6h prn     #HFpEF  #CAD  History of diastolic dysfunction and CAD managed with CABG in 2005, holding BP medications until source of bleeding is controlled. Patient is receiving IVF, will monitor clinically for signs of fluid overload in.   -restart PTA metoprolol 25 mg BID, half of PTA dose (50mg BID)  -restart PTA lasix 20 mg qday  -continue PTA statin 10 mg qday     # Hypothyroidism s/p thyroidectomy  -levothyroxine 150 mcg qday     #DM II  Hgb A1C 7.8 in 2019. Diet controlled at home. Not on any PTA diabetic meds.  - hypoglycemia protocol  - check glucose prn  - if consistently elevated, may start QID glucose checks       Diet: Advance Diet as Tolerated: Clear Liquid Diet    DVT Prophylaxis: VTE Prophylaxis contraindicated due to GI bleed concern  Martel Catheter: not present  Code Status: No CPR- Do NOT Intubate           Disposition Plan   Expected discharge: 1 - 2 days, pending VSS, stable Hbg  Entered: Bishop Mason 02/22/2021, 12:54 PM       The patient's care was discussed with the Attending Physician, Dr. Bryan.    Bishop Mason  Medical Student  [address]  [facility]  Please see sign in/sign out for up to date coverage information  ______________________________________________________________________    Interval History   No bowel movements overnight, 1x urinary incontinence. No blood in briefs this AM. Patient is very hungry, doesn't seem to understand why she hasn't had any food yet today. Denies abdominal pain, nausea, lightheadedness, shortness of breath.    Data reviewed today: I reviewed all medications, new labs and imaging results over the last 24 hours. I personally reviewed no images or EKG's today.    Physical Exam   Vital Signs: Temp: 97.4  F (36.3  C) Temp src: Oral BP: (!) 167/67 Pulse: 82   Resp: 18 SpO2: 97 % O2 Device: None (Room air)    Weight: 0 lbs 0 oz    Exam:  Constitutional: healthy, alert, no distress and confused  Head: Normocephalic. No masses, lesions, tenderness or abnormalities  Cardiovascular: PMI normal. No lifts, heaves, or thrills. RRR. No murmurs, clicks gallops or rub  Respiratory: Percussion normal. Good diaphragmatic excursion. Lungs clear  Gastrointestinal: Abdomen soft, non-tender. BS normal. No masses, organomegaly}  Musculoskeletal: extremities normal- no gross deformities noted and 2+ ankle edema, sore on L heel, bandaged   Skin: no suspicious lesions or rashes  Neurologic: no focal deficits, moving all extremities     Data   Recent Labs   Lab 02/22/21  1016 02/21/21  2310 02/21/21  1356   WBC 10.3  --  10.7   HGB 8.8* 9.2* 7.6*   MCV 96  --  99     --  463*   INR 2.72*  --  2.90*   *  --  140   POTASSIUM 4.0 4.2 4.3   CHLORIDE 115*  --  108   CO2 23  --  26   BUN 34*  --  31*   CR 0.89  --  0.78   ANIONGAP 6  --  7   BRINDA 8.0*  --  8.7   *  --  143*   ALBUMIN  --   --  2.1*   PROTTOTAL  --   --  6.1*   BILITOTAL  --   --  0.3   ALKPHOS  --   --  139   ALT  --   --  9   AST  --   --  13   TROPI  --   --  <0.015     Results for orders placed or performed during the hospital  encounter of 02/21/21   XR Chest Port 1 View    Impression    IMPRESSION:  Mild streaky left basilar atelectasis.    I have personally reviewed the examination and initial interpretation  and I agree with the findings.    VERONICA ROCA MD

## 2021-02-23 LAB
ERYTHROCYTE [DISTWIDTH] IN BLOOD BY AUTOMATED COUNT: 15.4 % (ref 10–15)
GLUCOSE BLDC GLUCOMTR-MCNC: 125 MG/DL (ref 70–99)
GLUCOSE BLDC GLUCOMTR-MCNC: 188 MG/DL (ref 70–99)
GLUCOSE BLDC GLUCOMTR-MCNC: 197 MG/DL (ref 70–99)
GLUCOSE BLDC GLUCOMTR-MCNC: 88 MG/DL (ref 70–99)
HCT VFR BLD AUTO: 29.3 % (ref 35–47)
HGB BLD-MCNC: 9.2 G/DL (ref 11.7–15.7)
INR PPP: 2.91 (ref 0.86–1.14)
MCH RBC QN AUTO: 30.5 PG (ref 26.5–33)
MCHC RBC AUTO-ENTMCNC: 31.4 G/DL (ref 31.5–36.5)
MCV RBC AUTO: 97 FL (ref 78–100)
PLATELET # BLD AUTO: 314 10E9/L (ref 150–450)
RBC # BLD AUTO: 3.02 10E12/L (ref 3.8–5.2)
WBC # BLD AUTO: 9.7 10E9/L (ref 4–11)

## 2021-02-23 PROCEDURE — 85610 PROTHROMBIN TIME: CPT | Performed by: STUDENT IN AN ORGANIZED HEALTH CARE EDUCATION/TRAINING PROGRAM

## 2021-02-23 PROCEDURE — 120N000002 HC R&B MED SURG/OB UMMC

## 2021-02-23 PROCEDURE — 250N000013 HC RX MED GY IP 250 OP 250 PS 637: Performed by: STUDENT IN AN ORGANIZED HEALTH CARE EDUCATION/TRAINING PROGRAM

## 2021-02-23 PROCEDURE — 36415 COLL VENOUS BLD VENIPUNCTURE: CPT | Performed by: STUDENT IN AN ORGANIZED HEALTH CARE EDUCATION/TRAINING PROGRAM

## 2021-02-23 PROCEDURE — 99232 SBSQ HOSP IP/OBS MODERATE 35: CPT | Mod: GC | Performed by: STUDENT IN AN ORGANIZED HEALTH CARE EDUCATION/TRAINING PROGRAM

## 2021-02-23 PROCEDURE — G0463 HOSPITAL OUTPT CLINIC VISIT: HCPCS

## 2021-02-23 PROCEDURE — 85027 COMPLETE CBC AUTOMATED: CPT | Performed by: STUDENT IN AN ORGANIZED HEALTH CARE EDUCATION/TRAINING PROGRAM

## 2021-02-23 PROCEDURE — 999N001017 HC STATISTIC GLUCOSE BY METER IP

## 2021-02-23 RX ORDER — METOPROLOL TARTRATE 50 MG
50 TABLET ORAL 2 TIMES DAILY
Status: DISCONTINUED | OUTPATIENT
Start: 2021-02-23 | End: 2021-02-24 | Stop reason: HOSPADM

## 2021-02-23 RX ORDER — MULTIPLE VITAMINS W/ MINERALS TAB 9MG-400MCG
1 TAB ORAL DAILY
Status: DISCONTINUED | OUTPATIENT
Start: 2021-02-23 | End: 2021-02-24 | Stop reason: HOSPADM

## 2021-02-23 RX ADMIN — METOPROLOL TARTRATE 50 MG: 25 TABLET, FILM COATED ORAL at 20:35

## 2021-02-23 RX ADMIN — LEVOTHYROXINE SODIUM 150 MCG: 150 TABLET ORAL at 08:13

## 2021-02-23 RX ADMIN — FUROSEMIDE 20 MG: 20 TABLET ORAL at 08:13

## 2021-02-23 RX ADMIN — ACETAMINOPHEN 1000 MG: 500 TABLET, FILM COATED ORAL at 13:51

## 2021-02-23 RX ADMIN — OXYCODONE HYDROCHLORIDE 5 MG: 5 TABLET ORAL at 04:35

## 2021-02-23 RX ADMIN — OXYCODONE HYDROCHLORIDE 10 MG: 10 TABLET, FILM COATED, EXTENDED RELEASE ORAL at 12:56

## 2021-02-23 RX ADMIN — Medication 125 MCG: at 08:13

## 2021-02-23 RX ADMIN — PANTOPRAZOLE SODIUM 40 MG: 40 TABLET, DELAYED RELEASE ORAL at 08:13

## 2021-02-23 RX ADMIN — ACETAMINOPHEN 1000 MG: 500 TABLET, FILM COATED ORAL at 08:13

## 2021-02-23 RX ADMIN — ALLOPURINOL 100 MG: 100 TABLET ORAL at 08:13

## 2021-02-23 RX ADMIN — MULTIPLE VITAMINS W/ MINERALS TAB 1 TABLET: TAB at 12:56

## 2021-02-23 RX ADMIN — ACETAMINOPHEN 1000 MG: 500 TABLET, FILM COATED ORAL at 20:35

## 2021-02-23 RX ADMIN — ATORVASTATIN CALCIUM 10 MG: 10 TABLET, FILM COATED ORAL at 08:13

## 2021-02-23 RX ADMIN — METOPROLOL TARTRATE 25 MG: 25 TABLET, FILM COATED ORAL at 08:13

## 2021-02-23 RX ADMIN — OXYCODONE HYDROCHLORIDE 10 MG: 10 TABLET, FILM COATED, EXTENDED RELEASE ORAL at 21:55

## 2021-02-23 ASSESSMENT — ACTIVITIES OF DAILY LIVING (ADL)
ADLS_ACUITY_SCORE: 27
ADLS_ACUITY_SCORE: 25
ADLS_ACUITY_SCORE: 27
ADLS_ACUITY_SCORE: 25

## 2021-02-23 NOTE — PROGRESS NOTES
"CLINICAL NUTRITION SERVICES - ASSESSMENT NOTE     Nutrition Prescription    RECOMMENDATIONS FOR MDs/PROVIDERS TO ORDER:  Advance diet as medically appropriate.     Malnutrition Status:    Patient does not meet two of the established criteria necessary for diagnosing malnutrition    Recommendations already ordered by Registered Dietitian (RD):  - Thera-vit-m (ordered as staff recommendation)  - Weigh pt    - Room service w/ assist once diet advances past clears   - Arginaid Extra as 2pm snack     Future/Additional Recommendations:  - Monitor for diet advancement and PO intake.   - Monitor wound status.      REASON FOR ASSESSMENT  Louisa Albarran is a/an 86 year old female assessed by the dietitian for Admission Nutrition Risk Screen for positive    Clinical History: DM II, CAD, diastolic dysfunction, HTN, Romario's disease, diverticulosis, hemorrhoids, polyps with hamartomas found on colonoscopy (2007), mechanical aortic valve on warfarin, septic shock 2/2 cholangitis, dementia, and a-fib presenting with rectal bleeding, currently hemodynamically stable    NUTRITION HISTORY  [facility] w/ staff at [facility]. Louisa usually eats well once they have her set up w/ her food. She feeds herself and eats % of meals. Usually weighs in the 160-170 lb range. Last weight of 169 lbs.     Pt confused and unable to provide nutrition history. Empty pudding cup and water by bedside, pt unsure when she had them. Pt reports that she is hungry.     CURRENT NUTRITION ORDERS  Diet: Clear Liquid  Intake/Tolerance: NPO/clears since admission     LABS  Labs reviewed  Na 145 (H)  BUN 34 (H)  GFR 59 (L)  BG 2/22-2/23:     MEDICATIONS  Medications reviewed  Lasix  Protonix  Miralax  Vit D3 1,000 units    ANTHROPOMETRICS  Height: 5' 6\"   Most Recent Weight: 169 lbs (76.8 kg) per staff at [facility]  IBW: 59 kg (130%)   BMI: Overweight BMI 25-29.9  Weight History:   Wt Readings from Last 1 " Encounters:   05/24/16 83.3 kg (183 lb 11.2 oz)     Dosing Weight: 63 kg (adjusted based on reported weight of 76.8 kg and IBW of 59 kg)     ASSESSED NUTRITION NEEDS  Estimated Energy Needs: 3093-9336 kcals/day (25 - 30 kcals/kg)  Justification: Maintenance  Estimated Protein Needs: 80-95 grams protein/day (1.3 - 1.5 grams of pro/kg)  Justification: Wound healing  Estimated Fluid Needs: (1 mL/kcal)   Justification: Maintenance    PHYSICAL FINDINGS  See malnutrition section below.  - Per chart review, noted open areas on coccyx and buttocks, WOCN consult is in place.     MALNUTRITION  % Intake: Decreased intake does not meet criteria  % Weight Loss: None noted per nursing home facility   Subcutaneous Fat Loss: None observed  Muscle Loss: None observed  Fluid Accumulation/Edema: Mild  Malnutrition Diagnosis: Patient does not meet two of the established criteria necessary for diagnosing malnutrition    NUTRITION DIAGNOSIS  Increased nutrient needs (protein) related to wound healing as evidenced by protein needs of 1.3-1.5 g/kg      INTERVENTIONS  Implementation  Nutrition Education: Pt not appropriate for nutrition education at this time.    Multivitamin/mineral supplement therapy- Multivitamin   Ordered room service w/ assist    Medical supplement therapy: Arginaid extra at 2pm     Goals  Once diet advances patient to consume % of nutritionally adequate meal trays TID, or the equivalent with supplements/snacks.     Monitoring/Evaluation  Progress toward goals will be monitored and evaluated per protocol.    Arlene Sánchez RD, LD  5A/5B RD pager [phone]

## 2021-02-23 NOTE — CONSULTS
[facility] Nurse Inpatient Pressure Injury Assessment   Reason for consultation: Evaluate and treat L) heel and coccyx      ASSESSMENT  Pressure Injury: on L) heel , present on admission ,   Pressure Injury is Stage Unstageable   Contributing factor of the pressure injury: pressure, immobility and moisture- suspect venous insufficiency too    Pressure Injury: on L) buttock , present on admission ,   Pressure Injury is Stage stage 2  Contributing factor of the pressure injury: pressure, immobility and moisture    Pressure Injury: on L) achilles, present on admission ,   Pressure Injury is Stage DTPI    BL buttocks and coccyx- IAD    Contributing factor of the pressure injury: pressure, immobility and moisture  Status: initial assessment and evolving  Recommend provider order: None, at this time     TREATMENT PLAN  L) heel and achilles wound: Every other day and PRN     Cleanse the area with NS and pat dry.    Apply No sting film barrier to periwound skin.    Cover wound with Sacral Mepilex (#787258)    Change dressing Q 3 days.    Turn and reposition Q 2hrs.    Continue with heel lift boot and pulsate mattress      POC for BL buttocks-     Cleanse the area with Sergey cleanse and protect, very gently with soft cloth.    Apply thin layer of critic aid paste.    With repeat application, do not scrub the paste, only remove soiled paste and reapply.    If complete removal of paste is necessary use baby oil/mineral oil (#428842) and soft wash cloth.    Ensure pt has Martin-cushion (#492646) while sitting up in the chair.    Use only one Covidien pad in between mattress and pt. No brief while in bed.      Orders Written  WO Nurse follow-up plan:weekly  Nursing to notify the Provider(s) and re-consult the [facility] Nurse if wound(s) deteriorates or new skin concern.    Patient History  According to provider note(s):  Louisa Albarran is a 86 year old female admitted on 2/21/2021. 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    Objective Data  Containment of urine/stool: Incontinence Protocol, Brief and purewick    Current Diet/ Nutrition:  Orders Placed This Encounter      Advance Diet as Tolerated: Clear Liquid Diet      Output:   I/O last 3 completed shifts:  In: 1400 [P.O.:1400]  Out: 800 [Urine:800]    Risk Assessment:   Sensory Perception: 3-->slightly limited  Moisture: 3-->occasionally moist  Activity: 1-->bedfast  Mobility: 2-->very limited  Nutrition: 2-->probably inadequate  Friction and Shear: 2-->potential problem  Mauricio Score: 13      Labs:   Recent Labs   Lab 02/23/21  0926 02/23/21  0607 02/21/21  1356 02/21/21  1356   ALBUMIN  --   --   --  2.1*   HGB  --  9.2*   < > 7.6*   INR 2.91*  --    < > 2.90*   WBC  --  9.7   < > 10.7    < > = values in this interval not displayed.       Physical Exam  Skin inspection: focused coccyx, sacrum, BL buttocks and BL heels    Wound Location:  L) heel        Date of last Photo 2/23- unable to take better picture due to pt complaining pain  Wound History: POA. Pt unable to provide hx.  Measurements (length x width x depth, in cm) 19 cm x 9 cm  x  0.1 cm (area extends entire heel medial, lateral and plantar)  Wound Base:  100 % dark black nonviable tissue, some pale pink tissue on posterior aspect  Tunneling N/A  Undermining N/A  Palpation of the wound bed: boggy   Periwound skin: intact and large brusie on lateral foot  Color: dusky and purple  Temperature: normal   Drainage:, small  Description of drainage: serosanguinous  Odor: none  Pain: moderate, with assessment    Wound Location:  L) achilles        Date of last Photo 2/23  Wound History: POA. Pt unable to provide hx.  Measurements (length x width x depth, in cm) 3  x 2 x 0.0 cm  Wound Base:  100 % dark maroon purple nonblanchable area  Tunneling N/A  Undermining N/A  Palpation of the wound bed: normal  Periwound skin: intact   Color: dusky and purple  Temperature: normal   Drainage:, none  Description of drainage: none  Odor: none  Pain: moderate, with assessment    Wound Location:  L) buttock      Date of last Photo 2/23  Wound History: POA. Pt unable to provide hx.  Measurements (length x width x depth, in cm) 0.8 cm x 0.5 cm  x  0.1 cm and 0.3x0.3x0.1  Wound Base:  100 % dermis  Undermining N/A  Palpation of the wound bed: normal  Periwound skin: intact and blanchable erythema  Color: dusky discoloration from chronic moisture  Temperature: normal   Drainage:, small  Description of drainage: serosanguinous  Odor: none  Pain: moderate, with assessment    Interventions  Current support surface: Standard  Low air loss mattress  Current off-loading measures: Heel off-loading boot(s), Foam padding and Pillows  Repositioning aid: Pillows  Visual inspection of wound(s) completed   Wound Care: was done per plan of care.  Supplies: floor stock and discussed with RN  Educated provided: importance of repositioning, plan of care, wound progress, Moisture management and Off-loading pressure  Education provided to: nurse  Discussed importance of:repositioning every 2 hours, off-loading pressure to wound, wearing off-loading boots, their role in pressure injury prevention, dressing change frequency, off-loading mattress and moisture management  Discussed plan of care with Nurse    Jo Ann Hall RN

## 2021-02-23 NOTE — PROGRESS NOTES
Resident/Fellow Attestation   I, Eliel Hickman , was present with the medical/LUCILLE student who participated in the service and in the documentation of the note.  I have verified the history and personally performed the physical exam and medical decision making.  I agree with the assessment and plan of care as documented in the note.      Louisa Albarran is a 86 year old female with a PMH significant for mechanical aortic valve on warfarin, diverticulosis, and dementia who presented from a nursing on on 2/21/2021 with a likely lower GI bleed. Her hgb on admission was 7.6 (baseline ~9.3 in 05/2016). She was transfused 2u pRBCs and her hgb has remained stable since that time although she continues to have melanotic stools. Her INR has remained therapeutic as we have been holding her warfarin. GI was consulted who recommended a colonoscopy pending goals of care. Discussed with the patient's son Keshav (who is the patient's decision maker), who would like to discuss the options with his mother further.  - trend CBC, transfuse for hgb <7  - continue holding warfarin at this time, trend INR  - clear liquid diet starting at [place]  - GI consulted, appreciate recs  - PPI BID    Eliel Hickman MD  Internal Medicine, PGY-3  [facility]  Contact information available via [facility] Paging/Directory  Date of Service (when I saw the patient): 02/23/21    [facility]    Progress Note - Maroon 2 Service        Date of Admission:  2/21/2021    Assessment & Plan       Louisa Albarran is a 86 year old female admitted on 2/21/2021. 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 BUN/Cr ratio of 40 on presentation suggests possible fast upper GI bleed w/increased absorption of globin proteins, although hematochezia, clotted blood, and patient history suggest lower GI source is likely source. Patient is hemodynamically stable. INR therapeutic 2.91 on 2/23   -GI following, appreciate recs              > Will hold off on inpatient colonoscopy at this time, son is visiting this evening and will have a more clear picture of goals of care then.   -s/p transfusion of 2 unit(s) PRBCs in the ED  -full liquid diet, advance as tolerated  -discontinued IVF  -PO PPI once daily  -trend hemoglobin, INR  -hold PTA warfarin today considering therapeutic INR, reassess INR tomorrow AM  -If hemodynamically unstable, consider reversal then start heparin gtt  -miralax  -pain control: PTA tylenol 1g TID, PTA oxycodone 10mg q12h, oxycodone 5mg q6h prn     #HFpEF  #CAD  History of diastolic dysfunction and CAD managed with CABG in 2005, holding BP medications until source of bleeding is controlled. Patient is receiving IVF, will monitor clinically for signs of fluid overload in.   -increase PTA metoprolol to 50 mg BID  -PTA lasix 20 mg qday  -PTA statin 10 mg qday  -discontinue tele     # Hypothyroidism s/p thyroidectomy  -levothyroxine 150 mcg qday     #DM II  Hgb A1C 7.8 in 2019. Diet controlled at home. Not on any PTA diabetic meds.  - hypoglycemia protocol  - check glucose prn  - if consistently elevated, may start QID glucose checks       Diet: Room Service  Snacks/Supplements Adult: Other; Arginaid Extra at 2pm snack; Between Meals  Advance Diet as Tolerated: Full Liquid Diet    DVT Prophylaxis: VTE Prophylaxis contraindicated due to GI bleed concern  Martel Catheter: not present  Code Status: No CPR- Do NOT  Intubate           Disposition Plan   Expected discharge: 1 - 2 days, pending VSS, stable Hbg, colonoscopy decision  Entered: Bishop Mason 02/23/2021, 1:11 PM       The patient's care was discussed with the Attending Physician, Dr. Moya.    Bishop Mason  Medical Student  [address]  Please see sign in/sign out for up to date coverage information  ______________________________________________________________________    Interval History   Louisa had several maroon colored BMs today, continues to  deny abdominal pain, rectal pain, nausea, dizziness. Remains hemodynamically stable, eating well. Wound nurse came by today to address her  pressure injury on the L heel. Nutrition came by and recommended increased protein intake for optimal wound healing. Patient is happily resting in her room, watching her favorite Bishop Perales movie, reading the paper, and eating pudding. Talked with son, Keshav, over the phone today, he will come by after work around 1630 to discuss goals of care with Louisa.     Data reviewed today: I reviewed all medications, new labs and imaging results over the last 24 hours. I personally reviewed no images or EKG's today.    Physical Exam   Vital Signs: Temp: 98.9  F (37.2  C) Temp src: Oral BP: (!) 149/77 Pulse: 107   Resp: 16 SpO2: 99 % O2 Device: None (Room air)    Weight: 0 lbs 0 oz    Exam:  Constitutional: healthy, alert, no distress and confused  Head: Normocephalic. No masses, lesions, tenderness or abnormalities  Cardiovascular: irregularly irregular, 3/6 systolic murmur  Respiratory: Percussion normal. Good diaphragmatic excursion. Lungs clear  Gastrointestinal: Abdomen soft, non-tender. BS normal. No masses, organomegaly  Musculoskeletal: extremities normal- no gross deformities noted and 2+ ankle edema, pressure injury on L heal, scabbed and healing, bruising on L lateral foot  Skin: no suspicious lesions or  rashes  Neurologic: no focal deficits, moving all extremities, oriented to self, disoriented to time and place intermittently     Data   Recent Labs   Lab 02/23/21  0926 02/23/21  0607 02/22/21  2117 02/22/21  1016 02/21/21  2310 02/21/21  1356   WBC  --  9.7  --  10.3  --  10.7   HGB  --  9.2* 7.9* 8.8* 9.2* 7.6*   MCV  --  97  --  96  --  99   PLT  --  314  --  324  --  463*   INR 2.91*  --   --  2.72*  --  2.90*   NA  --   --   --  145*  --  140   POTASSIUM  --   --   --  4.0 4.2 4.3   CHLORIDE  --   --   --  115*  --  108   CO2  --   --   --  23  --  26   BUN  --   --   --  34*  --  31*   CR  --   --   --  0.89  --  0.78   ANIONGAP  --   --   --  6  --  7   BRINDA  --   --   --  8.0*  --  8.7   GLC  --   --   --  120*  --  143*   ALBUMIN  --   --   --   --   --  2.1*   PROTTOTAL  --   --   --   --   --  6.1*   BILITOTAL  --   --   --   --   --  0.3   ALKPHOS  --   --   --   --   --  139   ALT  --   --   --   --   --  9   AST  --   --   --   --   --  13   TROPI  --   --   --   --   --  <0.015     Results for orders placed or performed during the hospital encounter of 02/21/21   XR Chest Port 1 View    Impression    IMPRESSION:  Mild streaky left basilar atelectasis.    I have personally reviewed the examination and initial interpretation  and I agree with the findings.    VERONICA ROCA MD

## 2021-02-24 ENCOUNTER — RECORDS - HEALTHEAST (OUTPATIENT)
Dept: LAB | Facility: CLINIC | Age: 86
End: 2021-02-24

## 2021-02-24 VITALS
HEART RATE: 95 BPM | DIASTOLIC BLOOD PRESSURE: 56 MMHG | SYSTOLIC BLOOD PRESSURE: 149 MMHG | OXYGEN SATURATION: 98 % | TEMPERATURE: 99 F | RESPIRATION RATE: 18 BRPM

## 2021-02-24 LAB
ANION GAP SERPL CALCULATED.3IONS-SCNC: 7 MMOL/L (ref 3–14)
BUN SERPL-MCNC: 21 MG/DL (ref 7–30)
CALCIUM SERPL-MCNC: 8.2 MG/DL (ref 8.5–10.1)
CHLORIDE SERPL-SCNC: 111 MMOL/L (ref 94–109)
CO2 SERPL-SCNC: 24 MMOL/L (ref 20–32)
CREAT SERPL-MCNC: 0.75 MG/DL (ref 0.52–1.04)
ERYTHROCYTE [DISTWIDTH] IN BLOOD BY AUTOMATED COUNT: 15.4 % (ref 10–15)
GFR SERPL CREATININE-BSD FRML MDRD: 72 ML/MIN/{1.73_M2}
GLUCOSE SERPL-MCNC: 105 MG/DL (ref 70–99)
HCT VFR BLD AUTO: 29.4 % (ref 35–47)
HGB BLD-MCNC: 9.1 G/DL (ref 11.7–15.7)
INR PPP: 2.75 (ref 0.86–1.14)
MCH RBC QN AUTO: 30.3 PG (ref 26.5–33)
MCHC RBC AUTO-ENTMCNC: 31 G/DL (ref 31.5–36.5)
MCV RBC AUTO: 98 FL (ref 78–100)
PLATELET # BLD AUTO: 332 10E9/L (ref 150–450)
POTASSIUM SERPL-SCNC: 4 MMOL/L (ref 3.4–5.3)
RBC # BLD AUTO: 3 10E12/L (ref 3.8–5.2)
SODIUM SERPL-SCNC: 142 MMOL/L (ref 133–144)
WBC # BLD AUTO: 9.1 10E9/L (ref 4–11)

## 2021-02-24 PROCEDURE — 250N000013 HC RX MED GY IP 250 OP 250 PS 637: Performed by: STUDENT IN AN ORGANIZED HEALTH CARE EDUCATION/TRAINING PROGRAM

## 2021-02-24 PROCEDURE — 99238 HOSP IP/OBS DSCHRG MGMT 30/<: CPT | Mod: GC | Performed by: STUDENT IN AN ORGANIZED HEALTH CARE EDUCATION/TRAINING PROGRAM

## 2021-02-24 PROCEDURE — 36415 COLL VENOUS BLD VENIPUNCTURE: CPT | Performed by: STUDENT IN AN ORGANIZED HEALTH CARE EDUCATION/TRAINING PROGRAM

## 2021-02-24 PROCEDURE — 80048 BASIC METABOLIC PNL TOTAL CA: CPT | Performed by: STUDENT IN AN ORGANIZED HEALTH CARE EDUCATION/TRAINING PROGRAM

## 2021-02-24 PROCEDURE — 85027 COMPLETE CBC AUTOMATED: CPT | Performed by: STUDENT IN AN ORGANIZED HEALTH CARE EDUCATION/TRAINING PROGRAM

## 2021-02-24 PROCEDURE — 99207 PR NO BILLABLE SERVICE THIS VISIT: CPT | Performed by: NURSE PRACTITIONER

## 2021-02-24 PROCEDURE — 85610 PROTHROMBIN TIME: CPT | Performed by: STUDENT IN AN ORGANIZED HEALTH CARE EDUCATION/TRAINING PROGRAM

## 2021-02-24 RX ORDER — WARFARIN SODIUM 1 MG/1
1 TABLET ORAL DAILY
Qty: 30 TABLET | Refills: 0 | Status: SHIPPED | OUTPATIENT
Start: 2021-02-24

## 2021-02-24 RX ADMIN — LEVOTHYROXINE SODIUM 150 MCG: 150 TABLET ORAL at 08:14

## 2021-02-24 RX ADMIN — Medication 125 MCG: at 08:13

## 2021-02-24 RX ADMIN — OXYCODONE HYDROCHLORIDE 10 MG: 10 TABLET, FILM COATED, EXTENDED RELEASE ORAL at 09:11

## 2021-02-24 RX ADMIN — FUROSEMIDE 20 MG: 20 TABLET ORAL at 08:14

## 2021-02-24 RX ADMIN — PANTOPRAZOLE SODIUM 40 MG: 40 TABLET, DELAYED RELEASE ORAL at 08:14

## 2021-02-24 RX ADMIN — OXYCODONE HYDROCHLORIDE 5 MG: 5 TABLET ORAL at 09:11

## 2021-02-24 RX ADMIN — OXYCODONE HYDROCHLORIDE 5 MG: 5 TABLET ORAL at 02:00

## 2021-02-24 RX ADMIN — ALLOPURINOL 100 MG: 100 TABLET ORAL at 08:14

## 2021-02-24 RX ADMIN — DICLOFENAC SODIUM 2 G: 10 GEL TOPICAL at 16:11

## 2021-02-24 RX ADMIN — MULTIPLE VITAMINS W/ MINERALS TAB 1 TABLET: TAB at 13:22

## 2021-02-24 RX ADMIN — ACETAMINOPHEN 1000 MG: 500 TABLET, FILM COATED ORAL at 08:14

## 2021-02-24 RX ADMIN — DICLOFENAC SODIUM 2 G: 10 GEL TOPICAL at 13:22

## 2021-02-24 RX ADMIN — ACETAMINOPHEN 1000 MG: 500 TABLET, FILM COATED ORAL at 13:22

## 2021-02-24 RX ADMIN — ATORVASTATIN CALCIUM 10 MG: 10 TABLET, FILM COATED ORAL at 08:14

## 2021-02-24 RX ADMIN — METOPROLOL TARTRATE 50 MG: 25 TABLET, FILM COATED ORAL at 08:14

## 2021-02-24 ASSESSMENT — ACTIVITIES OF DAILY LIVING (ADL)
ADLS_ACUITY_SCORE: 27

## 2021-02-24 NOTE — DISCHARGE SUMMARY
[facility]  Discharge Summary - Medicine & Pediatrics       Date of Admission:  2/21/2021  Date of Discharge:  2/24/2021  7:05 PM  Discharging Provider: Dr. Moya  Discharge Service: Robin Rosario    Discharge Diagnoses   - GI bleed  - Acute blood loss anemia  - s/p mechanical aortic valve on warfarin    Follow-ups Needed After Discharge   Follow-up Appointments     Follow Up and recommended labs and tests      Follow up with primary care provider in 1 week.  The following labs/tests   are recommended: INR, CBC.  Follow up with anti-coagulation clinic to manage warfarin dose within 2   days with an INR check.  Follow up with wound care regarding left heel wound             Unresulted Labs Ordered in the Past 30 Days of this Admission       No orders found from 1/22/2021 to 2/22/2021.            Discharge Disposition   Discharged to long-term care facility  Condition at discharge: Stable      Hospital Course   Louisa Albarran was admitted on 2/21/2021 for painless hematochezia.  The following problems were addressed during her hospitalization:    # Painless hematochezia:  # Concern for lower GI bleed:  # History of hamartomas on colonoscopy:  Patient presented with blood clots per rectum and found to have an acute anemia, likely secondary to a lower GI bleed. The patient was transfused 2 units pRBCs while in the ED and started on an IV PPI. Her anticoagulation was held on admission and GI was consulted. GI stated that they would offer a colonoscopy if the procedure was within the patient's goals of care. The patient continued to have some bloody bowel movements however her hgb continued to remain stable and improved. Ultimately the patient's son Keshav who is the decision maker discussed the options with the patient, and elected to forgo the colonoscopy as it was not within her goals of care.  The patient remained hemodynamically stable with a stable hgb at time of  discharge  - follow up with PCP within 1 week with a recheck CBC and INR  - continue PTA PPI  - anticoagulation as below    #s/p mechanical aortic valve on warfarin (INR goal 2.5-3.5)   discussed warfarin dosing with pharmacy prior to discharge, her INR remained therapeutic throughout her hospitalization despite not receiving any warfarin doses. Per pharmacy recs, we will start warfarin 1mg daily and have the patient recheck an INR in 2 days with plans for her PCP and anticoagulation clinic to manage her further warfarin dosing.    #Left heel wound  Suspect this wound, which was present on admission, is due to a pressure injury. Patient remained afebrile and did not have a leukocytosis throughout her hospitalization   - follow up with wound care  - consider XR to evaluate if worsening pain outpatient    # HFpEF:  # CAD:  - continue metoprolol  50 mg BID  - continue lasix 20 mg qday  - continue atorvastatin 10 mg qday    # Hypothyroidism s/p thyroidectomy:  - Continue PTA levothyroxine 150 mcg qday    # Type 2 Diabetes Mellitus:  Hgb A1C 7.8 in 2019. Diet controlled at home. Not on any PTA diabetic meds.      Consultations This Hospital Stay   GI LUMINAL ADULT IP CONSULT  MEDICATION HISTORY IP PHARMACY CONSULT  VASCULAR ACCESS CARE ADULT IP CONSULT  CARE MANAGEMENT / SOCIAL WORK IP CONSULT  WOUND OSTOMY CONTINENCE NURSE  IP CONSULT  PHARMACY TO DOSE WARFARIN    Code Status   No CPR- Do NOT Intubate       The patient was discussed with Dr. Griffin Hickman MD  Internal Medicine, PGY-3  [address] [address]  Phone: [phone]  ______________________________________________________________________    Physical Exam   Vital Signs: Temp: 99  F (37.2  C) Temp src: Oral BP: (!) 149/56 Pulse: 95   Resp: 18 SpO2: 98 % O2 Device: None (Room air)    Weight: 0 lbs 0 oz     GENERAL: Alert, interactive, NAD  HEENT: NC/AT, no conjunctivitis, anicteric  sclera  LUNGS: clear to auscultation bilaterally  HEART: regular rate and rhythm  ABDOMEN: Soft, nontender, nondistended. +BS  EXTREMITIES: No LE edema bilaterally, left heel with dressings in place  NEURO: alert, moving all 4 extremities equally, grossly nonfocal        Primary Care Physician   Gustavo Shepard    Discharge Orders      INR     CBC with platelets    Last Lab Result: Hemoglobin (g/dL)       Date                     Value                 02/24/2021               9.1 (L)          ----------     Wound Care Referral      General info for SNF    Length of Stay Estimate: Long Term Care  Condition at Discharge: Stable  Level of care:board and care  Rehabilitation Potential: Fair  Admission H&P remains valid and up-to-date: Yes  Recent Chemotherapy: N/A  Use Nursing Home Standing Orders: Yes     Mantoux instructions    Give two-step Mantoux (PPD) Per Facility Policy Yes     Reason for your hospital stay    You were hospitalized for a lower GI bleed. You were transfused blood products. We watched your vital signs and your blood counts. After discussion with you and your son, you decided not to pursue a colonoscopy. You should follow up with your PCP within 1 week for a lab check.     Wound care    L) heel and achilles wound: Every other day and PRN     Cleanse the area with NS and pat dry.    Apply No sting film barrier to periwound skin.    Cover wound with Sacral Mepilex (#[id number])    Change dressing Q 3 days.    Turn and reposition Q 2hrs.    Continue with heel lift boot and pulsate mattress       POC for BL buttocks-     Cleanse the area with Sergey cleanse and protect, very gently with soft cloth.    Apply thin layer of critic aid paste.    With repeat application, do not scrub the paste, only remove soiled paste and reapply.    If complete removal of paste is necessary use baby oil/mineral oil (#[id number]) and soft wash cloth.    Ensure pt has Martin-cushion (#[id number]) while sitting up in the  chair.    Use only one Covidien pad in between mattress and pt. No brief while in bed.     Activity - Up with nursing assistance     Follow Up and recommended labs and tests    Follow up with primary care provider in 1 week.  The following labs/tests are recommended: INR, CBC.  Follow up with anti-coagulation clinic to manage warfarin dose within 2 days with an INR check.  Follow up with wound care regarding left heel wound     No CPR- Do NOT Intubate     Advance Diet as Tolerated    Follow this diet upon discharge: Orders Placed This Encounter      Room Service      Snacks/Supplements Adult: Other; Arginaid Extra at 2pm snack; Between Meals      Advance Diet as Tolerated: Full Liquid Diet       Significant Results and Procedures   Most Recent 3 CBC's:  Recent Labs   Lab Test 02/24/21  0712 02/23/21  0607 02/22/21  2117 02/22/21  1016   WBC 9.1 9.7  --  10.3   HGB 9.1* 9.2* 7.9* 8.8*   MCV 98 97  --  96    314  --  324     Most Recent 3 BMP's:  Recent Labs   Lab Test 02/24/21  0712 02/22/21  1016 02/21/21  2310 02/21/21  1356    145*  --  140   POTASSIUM 4.0 4.0 4.2 4.3   CHLORIDE 111* 115*  --  108   CO2 24 23  --  26   BUN 21 34*  --  31*   CR 0.75 0.89  --  0.78   ANIONGAP 7 6  --  7   BRINDA 8.2* 8.0*  --  8.7   * 120*  --  143*     Most Recent 2 LFT's:  Recent Labs   Lab Test 02/21/21  1356 04/18/16  0807   AST 13 25   ALT 9 20   ALKPHOS 139 84   BILITOTAL 0.3 0.6     Most Recent 3 INR's:  Recent Labs   Lab Test 02/24/21  0712 02/23/21  0926 02/22/21  1016   INR 2.75* 2.91* 2.72*   ,   Results for orders placed or performed during the hospital encounter of 02/21/21   XR Chest Port 1 View    Narrative    Chest radiograph 2/21/2021 4:17 PM    HISTORY: GI bleed    COMPARISON: 5/4/2016    FINDINGS:  Single AP view of the chest. Postoperative changes of aortic valve  replacement. Median sternotomy wires are unchanged. Trachea is  midline. Cardiac silhouette is similar in size. No pneumothorax  or  significant pleural effusion. Pulmonary vascularity is distinct. Mild  streaky left basilar opacities. Aortic atherosclerosis. Low bone  mineral density with degenerative changes in the shoulders.      Impression    IMPRESSION:  Mild streaky left basilar atelectasis.    I have personally reviewed the examination and initial interpretation  and I agree with the findings.    VERONICA ROCA MD     *Note: Due to a large number of results and/or encounters for the requested time period, some results have not been displayed. A complete set of results can be found in Results Review.       Discharge Medications   Discharge Medication List as of 2/24/2021  4:27 PM        CONTINUE these medications which have CHANGED    Details   warfarin ANTICOAGULANT (COUMADIN) 1 MG tablet Take 1 tablet (1 mg) by mouth daily Until 2/25/21, Disp-30 tablet, R-0, E-Prescribe           CONTINUE these medications which have NOT CHANGED    Details   acetaminophen (TYLENOL) 500 MG tablet Take 1,000 mg by mouth 3 times daily At 0600, 1000, & 1600, Historical      allopurinol (ZYLOPRIM) 100 MG tablet Take 1 tablet (100 mg) by mouth daily, Disp-90 tablet, R-3, Transitional      amoxicillin (AMOXIL) 500 MG capsule Take 2,000 mg by mouth daily Give 60 minutes before dental appointments and cortisone injections, Historical      atorvastatin (LIPITOR) 10 MG tablet Take 10 mg by mouth daily, Historical      benzocaine (ANBESOL) 10 % gel Apply thin layer topically to right lateral tongue 3 times daily as neededHistorical      BLOOD GLUCOSE METER KIT PER PATIENT HEALTH PLANDisp-1 Kit, [id number]      blood glucose monitoring (NO BRAND SPECIFIED) test strip Once daily or as directedDisp-100 each, [id number]      furosemide (LASIX) 20 MG tablet Take 20 mg by mouth daily, Historical      hypromellose (ARTIFICIAL TEARS) 0.4 % SOLN Place 1 drop into both eyes every hour as needed for dry eyes, Transitional      levothyroxine  (SYNTHROID/LEVOTHROID) 150 MCG tablet Take 150 mcg by mouth daily, Historical      metoprolol (LOPRESSOR) 50 MG tablet Take 1 tablet (50 mg) by mouth 2 times daily, Disp-60 tablet, Transitional      Nutritional Supplements (PROSOURCE) LIQD Take 30mL of 10g-100kcal/30mL oral liquid by mouth twice daily at 0900 & 1500, Historical      omeprazole (PRILOSEC) 10 MG DR capsule Take 10 mg by mouth daily, Historical      ONE TOUCH LANCETS MISC As directed., Disp-100 each, R-3, E-Prescribe      !! ORDER FOR DME Equipment being ordered: Standard walker with 2 wheels on the fronDisp-1 Device, R-0, Normal      !! ORDER FOR DME Equipment being ordered: Wrap Around hinged knee braceDisp-1 Device, R-0, Normal      oxyCODONE (OXYCONTIN) 10 MG 12 hr tablet Take 10 mg by mouth every 12 hours, Historical      oxyCODONE (ROXICODONE) 5 MG tablet Take 5 mg by mouth every 6 hours as needed for severe pain, Historical      potassium chloride (K-DUR) 10 MEQ tablet Take 1 tablet (10 mEq) by mouth daily, Disp-30 tablet, R-1, No Print Out      senna-docusate (SENOKOT-S;PERICOLACE) 8.6-50 MG per tablet Take 1-2 tablets by mouth 2 times daily as needed for constipation, Disp-100 tablet, Transitional      Vitamin D3 (CHOLECALCIFEROL) 125 MCG (5000 UT) tablet Take 1 tablet by mouth daily, Historical       !! - Potential duplicate medications found. Please discuss with provider.        STOP taking these medications       ipratropium - albuterol 0.5 mg/2.5 mg/3 mL (DUONEB) 0.5-2.5 (3) MG/3ML nebulization Comments:   Reason for Stopping:             Allergies   Allergies   Allergen Reactions    Ciprofloxacin Other (See Comments)     Phlebitis when given IV

## 2021-02-24 NOTE — PROGRESS NOTES
Admitted/transferred from: 5B  2 RNskin assessment refused by pt.   Educated pt on importance and pt declined at this time due to the recent movement. Pt wants to rest for now. Tried twice to do skin assessment and pt continued to refuse.     Will continue to monitor.

## 2021-02-24 NOTE — PROGRESS NOTES
Care Management Discharge Note    Discharge Date: 02/24/21  Anticipated Discharge Time: 6:30pm    Discharge Disposition: Long Term Care-  [facility]  [address] [place] [address]  [phone]  Admissions:  [phone]  Fax:  [phone]  RN to RN:  [phone]    Discharge Transportation: SW arranged a w/c transport via [employer] (P:  [phone]).  PCS completed, faxed to billing and placed in her hard chart.    Private pay costs discussed: transportation costs    PAS Confirmation Code:  N/A-  Returning to LTC Facility  Patient/family educated on Medicare website which has current facility and service quality ratings: no    Education Provided on the Discharge Plan:  yes  Persons Notified of Discharge Plans: MD, RN, Charge RN, NST and Alondra ([facility])  Patient/Family in Agreement with the Plan: yes-  Pt is in agreement.  ASHKAN also spoke with Keshav (Son), he is in agreement as well.    Handoff Referral Completed: No    Additional Information:  IMM completed, faxed to billing and placed in pt's hard chart.  Discharge orders faxed to facility.    KLEBER Macario, [facility]  Phone:  [phone]   Pager:  [phone]

## 2021-02-24 NOTE — PLAN OF CARE
Alert and disoriented to the name of the hospital and also forgetful. Complained of pain in the right foot and requested oxycodone given x 1. Repositioned. Incontinent of bladder, perineal care done. Pure wick in place.  
Patient discharged at 1940 to [facility] ([facility]) by [facility] EMS crew members. Pt denied pain, CMS intact, and vs stable on RA. Brief discharge report given to EMS crew. Coumadin dose picked up by this RN and sent with patient. All belongings and documents sent with patient. Patient left the floor by stretcher. RN to RN report also given Kadie HORTON from the facility.   
Pt alert, disoriented to time. Forgetful. VSS on RA ex HTN. Turn with assist x2, likely lift OOB. C/o pain to Lt foot and perianal area. NPO earlier in day for poss GI procedure, no plans at this time. Pt tolerating clears. No nausea. Incont of B/B, purewick in place. Loose stool x2, first more brown than maroon, second more maroon. Skin to bottom is excoriated. MD made aware of Lt heel wound, no orders, mepilex drsg applied. Monitoring hgb.   
Pt alert, intermittently confused/forgetful. VSS on RA ex HTN. Turns with assist x2. C/o pain to Lt heel/foot, managed with positioning and scheduled meds. No nausea or SOB. Tolerating PO, diet advanced to full liquids. Incontinent of B/B. Purewick in place. Small stools x2, maroon/brown noted. Coccyx/butt excoriated and tender with cares. Drsg c/d/i to Lt heel with prevalon boots on. Seen by WOCN, wound care orders in place. Son Keshav here to visit and meet with MD NGOC informed.   
Pt arrived to unit at 2140 from 5A.    Pt is intermittently confused to place, situation and time. Q2hr turns and due to be turned at midnight; on pulsate mattress in place. Pt is wc bound and lift utilized. Murmur present along with valve click. No stool since transfer; 5A reported bloody stools decreasing through the pts hospitalization. Pt is incontinent of urine and stool; purewick in place. Tolerating full liq diet. L heel pressure injuring drsg CDI and supported in pressure reducing boot. Buttock pressure injury cleansed and cared for per orders; WOC RN seen pt today and updated orders. WOC does not want brief on pt in bed per 5A RN. L foot pain managed with scheduled OxyContin and Tylenol. BG monitored per orders. PIV SL. Cont POC.   
Pt is confused. Oriented to self. Intermittently disoriented to time , place and situation . Mood is labile. Refusing cares at times. Tolerating clear liquid diet. Complained of bilateral foot pain with relief from scheduled and prn pain meds. Hgb at HS is 7.9 , Dr. Kirkland informed. L heel dressing is clean, dry and intact. Applied L heel lift boot, refused R boot to be applied. R heel elevated with pillow. Noted open areas on coccyx and buttocks, Dr. Kirkland informed, WOCN consult is in place. Pulsate mattress in bed. Incontinent of urine, purewick in place but pt removes it at times. Had maroon stool at HS, Dr. Kirkland informed. BG monitored overnight. On tele monitor, tachycardic at times. Hgb this am is 9.2. Will continue to monitor and follow plan of care    
Pt is pleasantly confused. Oriented to self. Intermittently disoriented to time, place and situation. Denies pain. Tolerating full liquid diet. Transferred to  with all belongings per bed with staff at 9465. Son Keshav informed of room transfer.   
Time: 2212-9959    Reason for admission/Dx:   Chronic anticoagulation [Z79.01]  DNR (do not resuscitate) [Z66]  Atrial fibrillation with rapid ventricular response (H) [I48.91]  Gastrointestinal hemorrhage, unspecified gastrointestinal hemorrhage type [K92.2]     BP (!) 149/72   Pulse 107   Temp 97.8  F (36.6  C)   Resp 20   SpO2 98%     Shift changes: UUED admit from [facility] for rectal bleeding. Pt received 2x PRBC, by the time arriving to . Pt tolerated well, without signs of transfusion rx or fluid overload. Hgb recheck 9.2. Pt HTN + tachycardic, otherwise VSS, on RA. EKG showing LBBB. Disoriented to time, but overall is a unreliable and poor historian d/t forgetfulness and PMHx of dementia. Mood is pleasantly irritable. NPO since MD d/t potential procedure. 1x urinary incontinence; subsequently Purewick placed. On MIVF NS @ 75mL/hr. No BM, during NOC. LBM 2/21. Additionally, no rectal bleeding or evacuation of bloody clots present. INR 2.9. IV PPI given. BGLs normoglycemic.     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 Barrier crm applied.     Plan: Potential procedure/scope. Monitor stools. Trend Hgb and INR.     
VSS ex tachycardic. Disoriented to time and situation, pt has hx of dementia. Was tearful/crying most of the morning. Oxycontin and Oxycodone utilized for pain but pt states she still has a lot of pain in L foot. MD notified - Voltaren gel ordered. Incontinent of urine, purewick in place. Repositioned q 2hrs and w/ bed changes. No BM this shift. Tolerated small amount of clears for breakfast. Advanced to full liquid diet. Mepilex in place on coccyx. Heal wound dressing changed. Barrier cream applied to perineal area. PIV SL. Pt is assist of 2 + lift. Plan for discharge at 1830 back to facility via stretcher transport.  
Attending

## 2021-02-24 NOTE — PROGRESS NOTES
Gastroenterology Inpatient Sign Off Note    -Spoke with primary team regarding colonoscopy. Given hemodynamic stability (in the setting of therapeutic INR) , improving hgb, and consideration of patient's age/comorbidities would favor not moving forward with endoscopy at this time with consideration of the risks versus benefits  -Primary team discussed with patient and her son who are in agreement with this plan  -If future bleeding occurs, and hemodynamically she again remains stable, it would be reasonable to again conservatively manage. With large volume bleeding or significant hemodynamic instability may need to revisit the risks and benefits of anticoagulation and potential endoscopy    Inpatient GI consults service will sign off. No further recommendations at this time. If primary team has addition questions, please page consult fellow listed in Amion.    Current GI Consult Staff: Dr. Sultan Keshav [facility] GI Team  [phone]

## 2021-02-25 LAB — INR PPP: 2.68 (ref 0.9–1.1)

## 2021-02-26 ENCOUNTER — RECORDS - HEALTHEAST (OUTPATIENT)
Dept: LAB | Facility: CLINIC | Age: 86
End: 2021-02-26

## 2021-02-26 ENCOUNTER — DOCUMENTATION ONLY (OUTPATIENT)
Dept: OTHER | Facility: CLINIC | Age: 86
End: 2021-02-26

## 2021-02-26 ENCOUNTER — AMBULATORY - HEALTHEAST (OUTPATIENT)
Dept: OTHER | Facility: CLINIC | Age: 86
End: 2021-02-26

## 2021-02-26 NOTE — PROGRESS NOTES
#Pressure Injury, Left heel wound, present on admission (unstageable)  #Pressue Injury, left achilles, present on admission (Stage DTPI)  #Pressure Injury, left buttock, present on admission (stage 2)  #BL buttocks and coccyx- IAD   Patient remained afebrile and did not have a leukocytosis throughout her hospitalization. Contributing factors of the pressure injury include pressure, immobility, moisture. Venous insufficiency is likely also contributing to the LE wounds. Low suspicion for active infectious process. Wound Care was consulted for assistance with wound care and dressing  - follow up with wound care  - treatment plan per wound care:  L) heel and achilles wound: Every other day and PRN     Cleanse the area with NS and pat dry.    Apply No sting film barrier to periwound skin.    Cover wound with Sacral Mepilex (#[id number])    Change dressing Q 3 days.    Turn and reposition Q 2hrs.    Continue with heel lift boot and pulsate mattress     POC for BL buttocks-     Cleanse the area with Sergey cleanse and protect, very gently with soft cloth.    Apply thin layer of critic aid paste.    With repeat application, do not scrub the paste, only remove soiled paste and reapply.    If complete removal of paste is necessary use baby oil/mineral oil (#460195) and soft wash cloth.    Ensure pt has Martin-cushion (#[id number]) while sitting up in the chair.  Use only one Covidien pad in between mattress and pt. No brief while in bed      Eliel Hickman MD  Internal Medicine, PGY-3  [facility]  Contact information available via [facility] Paging/Directory

## 2021-03-01 LAB — INR PPP: 1.52 (ref 0.9–1.1)

## 2021-03-02 ENCOUNTER — TELEPHONE (OUTPATIENT)
Dept: WOUND CARE | Facility: CLINIC | Age: 86
End: 2021-03-02

## 2021-03-02 NOTE — TELEPHONE ENCOUNTER
Chris from [facility] wants to set up Louisa for a new patient appointment for her 3 foot wounds that she has had for months.   Call her at

## 2021-03-02 NOTE — TELEPHONE ENCOUNTER
Chart reviewed. Ok to schedule with Dr. Garsia. Dr. Cuadra, or Gretel LEVY ok if Dr. Garsia appt not available in the near future.

## 2021-03-04 ENCOUNTER — RECORDS - HEALTHEAST (OUTPATIENT)
Dept: LAB | Facility: CLINIC | Age: 86
End: 2021-03-04

## 2021-03-05 LAB
ERYTHROCYTE [DISTWIDTH] IN BLOOD BY AUTOMATED COUNT: 15.1 % (ref 11–14.5)
HCT VFR BLD AUTO: 27.7 % (ref 35–47)
HGB BLD-MCNC: 8.4 G/DL (ref 12–16)
INR PPP: 1.55 (ref 0.9–1.1)
MCH RBC QN AUTO: 29.6 PG (ref 27–34)
MCHC RBC AUTO-ENTMCNC: 30.3 G/DL (ref 32–36)
MCV RBC AUTO: 98 FL (ref 80–100)
PLATELET # BLD AUTO: 392 THOU/UL (ref 140–440)
PMV BLD AUTO: 9.9 FL (ref 8.5–12.5)
RBC # BLD AUTO: 2.84 MILL/UL (ref 3.8–5.4)
WBC: 9 THOU/UL (ref 4–11)

## 2021-03-06 ENCOUNTER — RECORDS - HEALTHEAST (OUTPATIENT)
Dept: LAB | Facility: CLINIC | Age: 86
End: 2021-03-06

## 2021-03-08 LAB — INR PPP: 1.72 (ref 0.9–1.1)

## 2021-03-14 ENCOUNTER — RECORDS - HEALTHEAST (OUTPATIENT)
Dept: LAB | Facility: CLINIC | Age: 86
End: 2021-03-14

## 2021-03-14 LAB — INR PPP: 1.88 (ref 0.9–1.1)

## 2021-03-15 ENCOUNTER — RECORDS - HEALTHEAST (OUTPATIENT)
Dept: LAB | Facility: CLINIC | Age: 86
End: 2021-03-15

## 2021-03-17 LAB — INR PPP: 1.69 (ref 0.9–1.1)

## 2021-03-18 ENCOUNTER — RECORDS - HEALTHEAST (OUTPATIENT)
Dept: LAB | Facility: CLINIC | Age: 86
End: 2021-03-18

## 2021-03-19 ENCOUNTER — RECORDS - HEALTHEAST (OUTPATIENT)
Dept: LAB | Facility: CLINIC | Age: 86
End: 2021-03-19

## 2021-03-19 LAB — INR PPP: 1.68 (ref 0.9–1.1)

## 2021-03-22 LAB — INR PPP: 2.31 (ref 0.9–1.1)

## 2021-03-23 ENCOUNTER — RECORDS - HEALTHEAST (OUTPATIENT)
Dept: LAB | Facility: CLINIC | Age: 86
End: 2021-03-23

## 2021-03-25 LAB — INR PPP: 2.19 (ref 0.9–1.1)

## 2021-03-26 ENCOUNTER — RECORDS - HEALTHEAST (OUTPATIENT)
Dept: LAB | Facility: CLINIC | Age: 86
End: 2021-03-26

## 2021-03-29 LAB — INR PPP: 2.54 (ref 0.9–1.1)

## 2021-04-02 ENCOUNTER — RECORDS - HEALTHEAST (OUTPATIENT)
Dept: LAB | Facility: CLINIC | Age: 86
End: 2021-04-02

## 2021-04-05 LAB — INR PPP: 3.03 (ref 0.9–1.1)

## 2021-04-10 ENCOUNTER — RECORDS - HEALTHEAST (OUTPATIENT)
Dept: LAB | Facility: CLINIC | Age: 86
End: 2021-04-10

## 2021-04-12 LAB — INR PPP: 3.39 (ref 0.9–1.1)

## 2021-04-14 ENCOUNTER — RECORDS - HEALTHEAST (OUTPATIENT)
Dept: LAB | Facility: CLINIC | Age: 86
End: 2021-04-14

## 2021-04-15 ENCOUNTER — RECORDS - HEALTHEAST (OUTPATIENT)
Dept: LAB | Facility: CLINIC | Age: 86
End: 2021-04-15

## 2021-04-15 LAB — INR PPP: 3.43 (ref 0.9–1.1)

## 2021-04-16 ENCOUNTER — RECORDS - HEALTHEAST (OUTPATIENT)
Dept: LAB | Facility: CLINIC | Age: 86
End: 2021-04-16

## 2021-04-16 LAB — INR PPP: 3.14 (ref 0.9–1.1)

## 2021-04-17 LAB — BACTERIA SPEC CULT: ABNORMAL

## 2021-04-19 LAB — INR PPP: 1.99 (ref 0.9–1.1)

## 2021-04-22 ENCOUNTER — RECORDS - HEALTHEAST (OUTPATIENT)
Dept: LAB | Facility: CLINIC | Age: 86
End: 2021-04-22

## 2021-04-23 LAB — INR PPP: 2.06 (ref 0.9–1.1)

## 2021-04-26 ENCOUNTER — RECORDS - HEALTHEAST (OUTPATIENT)
Dept: LAB | Facility: CLINIC | Age: 86
End: 2021-04-26

## 2021-04-26 LAB — INR PPP: 2.03 (ref 0.9–1.1)

## 2021-04-30 ENCOUNTER — RECORDS - HEALTHEAST (OUTPATIENT)
Dept: LAB | Facility: CLINIC | Age: 86
End: 2021-04-30

## 2021-05-03 LAB — INR PPP: 2.54 (ref 0.9–1.1)

## 2021-05-09 ENCOUNTER — RECORDS - HEALTHEAST (OUTPATIENT)
Dept: LAB | Facility: CLINIC | Age: 86
End: 2021-05-09

## 2021-05-11 LAB
INR PPP: 4.37 (ref 0.9–1.1)
POTASSIUM BLD-SCNC: 4.2 MMOL/L (ref 3.5–5)

## 2021-05-12 ENCOUNTER — RECORDS - HEALTHEAST (OUTPATIENT)
Dept: LAB | Facility: CLINIC | Age: 86
End: 2021-05-12

## 2021-05-13 LAB — INR PPP: 3.67 (ref 0.9–1.1)

## 2021-05-14 ENCOUNTER — RECORDS - HEALTHEAST (OUTPATIENT)
Dept: LAB | Facility: CLINIC | Age: 86
End: 2021-05-14

## 2021-05-17 LAB — INR PPP: 2.53 (ref 0.9–1.1)

## 2021-05-19 ENCOUNTER — RECORDS - HEALTHEAST (OUTPATIENT)
Dept: LAB | Facility: CLINIC | Age: 86
End: 2021-05-19

## 2021-05-21 LAB — INR PPP: 2.77 (ref 0.9–1.1)

## 2021-05-27 ENCOUNTER — RECORDS - HEALTHEAST (OUTPATIENT)
Dept: LAB | Facility: CLINIC | Age: 86
End: 2021-05-27

## 2021-05-28 LAB
ANION GAP SERPL CALCULATED.3IONS-SCNC: 12 MMOL/L (ref 5–18)
BASOPHILS # BLD AUTO: 0 THOU/UL (ref 0–0.2)
BASOPHILS NFR BLD AUTO: 0 % (ref 0–2)
BUN SERPL-MCNC: 33 MG/DL (ref 8–28)
CALCIUM SERPL-MCNC: 8.8 MG/DL (ref 8.5–10.5)
CHLORIDE BLD-SCNC: 106 MMOL/L (ref 98–107)
CO2 SERPL-SCNC: 20 MMOL/L (ref 22–31)
CREAT SERPL-MCNC: 0.65 MG/DL (ref 0.6–1.1)
EOSINOPHIL # BLD AUTO: 0 THOU/UL (ref 0–0.4)
EOSINOPHIL NFR BLD AUTO: 0 % (ref 0–6)
ERYTHROCYTE [DISTWIDTH] IN BLOOD BY AUTOMATED COUNT: 16.7 % (ref 11–14.5)
FERRITIN SERPL-MCNC: 852 NG/ML (ref 10–130)
GFR SERPL CREATININE-BSD FRML MDRD: >60 ML/MIN/1.73M2
GLUCOSE BLD-MCNC: 133 MG/DL (ref 70–125)
HCT VFR BLD AUTO: 28.3 % (ref 35–47)
HGB BLD-MCNC: 8.6 G/DL (ref 12–16)
IMM GRANULOCYTES # BLD: 0.2 THOU/UL
IMM GRANULOCYTES NFR BLD: 2 %
INR PPP: 4.84 (ref 0.9–1.1)
LYMPHOCYTES # BLD AUTO: 0.8 THOU/UL (ref 0.8–4.4)
LYMPHOCYTES NFR BLD AUTO: 7 % (ref 20–40)
MCH RBC QN AUTO: 28.7 PG (ref 27–34)
MCHC RBC AUTO-ENTMCNC: 30.4 G/DL (ref 32–36)
MCV RBC AUTO: 94 FL (ref 80–100)
MONOCYTES # BLD AUTO: 0.5 THOU/UL (ref 0–0.9)
MONOCYTES NFR BLD AUTO: 5 % (ref 2–10)
NEUTROPHILS # BLD AUTO: 9.9 THOU/UL (ref 2–7.7)
NEUTROPHILS NFR BLD AUTO: 86 % (ref 50–70)
PLATELET # BLD AUTO: 482 THOU/UL (ref 140–440)
PMV BLD AUTO: 10 FL (ref 8.5–12.5)
POTASSIUM BLD-SCNC: 4.5 MMOL/L (ref 3.5–5)
RBC # BLD AUTO: 3 MILL/UL (ref 3.8–5.4)
SODIUM SERPL-SCNC: 138 MMOL/L (ref 136–145)
TSH SERPL DL<=0.005 MIU/L-ACNC: 0.48 UIU/ML (ref 0.3–5)
WBC: 11.4 THOU/UL (ref 4–11)

## 2021-05-30 ENCOUNTER — RECORDS - HEALTHEAST (OUTPATIENT)
Dept: ADMINISTRATIVE | Facility: CLINIC | Age: 86
End: 2021-05-30

## 2021-05-30 ENCOUNTER — RECORDS - HEALTHEAST (OUTPATIENT)
Dept: LAB | Facility: CLINIC | Age: 86
End: 2021-05-30

## 2021-05-31 ENCOUNTER — RECORDS - HEALTHEAST (OUTPATIENT)
Dept: ADMINISTRATIVE | Facility: CLINIC | Age: 86
End: 2021-05-31

## 2021-06-01 LAB — INR PPP: 4.7 (ref 0.9–1.1)

## 2021-06-03 ENCOUNTER — RECORDS - HEALTHEAST (OUTPATIENT)
Dept: LAB | Facility: CLINIC | Age: 86
End: 2021-06-03

## 2021-06-04 LAB — INR PPP: 4.21 (ref 0.9–1.1)

## 2021-06-05 ENCOUNTER — RECORDS - HEALTHEAST (OUTPATIENT)
Dept: LAB | Facility: CLINIC | Age: 86
End: 2021-06-05

## 2021-06-07 ENCOUNTER — RECORDS - HEALTHEAST (OUTPATIENT)
Dept: LAB | Facility: CLINIC | Age: 86
End: 2021-06-07

## 2021-06-07 LAB — INR PPP: 2.54 (ref 0.9–1.1)

## 2021-06-08 LAB — INR PPP: 2.19 (ref 0.9–1.1)

## 2021-06-09 ENCOUNTER — RECORDS - HEALTHEAST (OUTPATIENT)
Dept: LAB | Facility: CLINIC | Age: 86
End: 2021-06-09

## 2021-06-10 ENCOUNTER — RECORDS - HEALTHEAST (OUTPATIENT)
Dept: LAB | Facility: CLINIC | Age: 86
End: 2021-06-10

## 2021-06-10 LAB — INR PPP: 1.85 (ref 0.9–1.1)

## 2021-06-12 LAB — TSH SERPL DL<=0.005 MIU/L-ACNC: 1.66 UIU/ML (ref 0.3–5)

## 2021-06-14 ENCOUNTER — RECORDS - HEALTHEAST (OUTPATIENT)
Dept: LAB | Facility: CLINIC | Age: 86
End: 2021-06-14

## 2021-06-14 LAB — INR PPP: 1.63 (ref 0.9–1.1)

## 2021-06-16 LAB — INR PPP: 1.66 (ref 0.9–1.1)

## 2021-06-19 ENCOUNTER — RECORDS - HEALTHEAST (OUTPATIENT)
Dept: LAB | Facility: CLINIC | Age: 86
End: 2021-06-19

## 2021-06-21 LAB — INR PPP: 1.47 (ref 0.9–1.1)

## 2021-06-24 ENCOUNTER — RECORDS - HEALTHEAST (OUTPATIENT)
Dept: LAB | Facility: CLINIC | Age: 86
End: 2021-06-24

## 2021-06-25 ENCOUNTER — RECORDS - HEALTHEAST (OUTPATIENT)
Dept: LAB | Facility: CLINIC | Age: 86
End: 2021-06-25

## 2021-06-25 LAB — INR PPP: 1.52 (ref 0.9–1.1)

## 2021-06-28 LAB — INR PPP: 1.6 (ref 0.9–1.1)

## 2021-07-01 ENCOUNTER — RECORDS - HEALTHEAST (OUTPATIENT)
Dept: LAB | Facility: CLINIC | Age: 86
End: 2021-07-01

## 2021-07-02 LAB — INR PPP: 1.76 (ref 0.9–1.1)

## 2021-07-05 ENCOUNTER — RECORDS - HEALTHEAST (OUTPATIENT)
Dept: LAB | Facility: CLINIC | Age: 86
End: 2021-07-05

## 2021-07-06 LAB — INR PPP: 1.87 (ref 0.9–1.1)

## 2021-07-12 ENCOUNTER — LAB REQUISITION (OUTPATIENT)
Dept: LAB | Facility: CLINIC | Age: 86
End: 2021-07-12
Payer: MEDICARE

## 2021-07-12 DIAGNOSIS — E03.9 HYPOTHYROIDISM, UNSPECIFIED: ICD-10-CM

## 2021-07-13 ENCOUNTER — LAB REQUISITION (OUTPATIENT)
Dept: LAB | Facility: CLINIC | Age: 86
End: 2021-07-13
Payer: COMMERCIAL

## 2021-07-13 ENCOUNTER — LAB REQUISITION (OUTPATIENT)
Dept: LAB | Facility: CLINIC | Age: 86
End: 2021-07-13
Payer: OTHER MISCELLANEOUS

## 2021-07-13 DIAGNOSIS — I48.91 UNSPECIFIED ATRIAL FIBRILLATION (H): ICD-10-CM

## 2021-07-13 LAB
INR PPP: 1.9 (ref 0.85–1.15)
TSH SERPL DL<=0.005 MIU/L-ACNC: 8.07 UIU/ML (ref 0.3–5)

## 2021-07-13 PROCEDURE — 84443 ASSAY THYROID STIM HORMONE: CPT | Mod: ORL | Performed by: INTERNAL MEDICINE

## 2021-07-13 PROCEDURE — 36415 COLL VENOUS BLD VENIPUNCTURE: CPT | Mod: ORL | Performed by: INTERNAL MEDICINE

## 2021-07-13 PROCEDURE — 85610 PROTHROMBIN TIME: CPT | Mod: ORL | Performed by: INTERNAL MEDICINE

## 2021-07-13 PROCEDURE — P9604 ONE-WAY ALLOW PRORATED TRIP: HCPCS | Mod: ORL | Performed by: INTERNAL MEDICINE

## 2021-07-19 ENCOUNTER — LAB REQUISITION (OUTPATIENT)
Dept: LAB | Facility: CLINIC | Age: 86
End: 2021-07-19
Payer: MEDICARE

## 2021-07-19 DIAGNOSIS — I48.91 UNSPECIFIED ATRIAL FIBRILLATION (H): ICD-10-CM

## 2021-07-20 LAB — INR PPP: 2.29 (ref 0.85–1.15)

## 2021-07-20 PROCEDURE — 85610 PROTHROMBIN TIME: CPT | Mod: ORL | Performed by: NURSE PRACTITIONER

## 2021-07-20 PROCEDURE — 36415 COLL VENOUS BLD VENIPUNCTURE: CPT | Mod: ORL | Performed by: NURSE PRACTITIONER

## 2021-07-20 PROCEDURE — P9604 ONE-WAY ALLOW PRORATED TRIP: HCPCS | Mod: ORL | Performed by: NURSE PRACTITIONER

## 2021-07-26 ENCOUNTER — LAB REQUISITION (OUTPATIENT)
Dept: LAB | Facility: CLINIC | Age: 86
End: 2021-07-26
Payer: MEDICARE

## 2021-07-26 DIAGNOSIS — I48.91 UNSPECIFIED ATRIAL FIBRILLATION (H): ICD-10-CM

## 2021-07-27 LAB — INR PPP: 3.47 (ref 0.85–1.15)

## 2021-07-27 PROCEDURE — 85610 PROTHROMBIN TIME: CPT | Mod: ORL | Performed by: INTERNAL MEDICINE

## 2021-07-27 PROCEDURE — P9604 ONE-WAY ALLOW PRORATED TRIP: HCPCS | Mod: ORL | Performed by: INTERNAL MEDICINE

## 2021-07-27 PROCEDURE — 36415 COLL VENOUS BLD VENIPUNCTURE: CPT | Mod: ORL | Performed by: INTERNAL MEDICINE

## 2021-07-28 ENCOUNTER — LAB REQUISITION (OUTPATIENT)
Dept: LAB | Facility: CLINIC | Age: 86
End: 2021-07-28
Payer: MEDICARE

## 2021-07-28 DIAGNOSIS — I48.91 UNSPECIFIED ATRIAL FIBRILLATION (H): ICD-10-CM

## 2021-07-29 LAB — INR PPP: 3.27 (ref 0.85–1.15)

## 2021-07-29 PROCEDURE — 36415 COLL VENOUS BLD VENIPUNCTURE: CPT | Mod: ORL | Performed by: INTERNAL MEDICINE

## 2021-07-29 PROCEDURE — 85610 PROTHROMBIN TIME: CPT | Mod: ORL | Performed by: INTERNAL MEDICINE

## 2021-07-29 PROCEDURE — P9604 ONE-WAY ALLOW PRORATED TRIP: HCPCS | Mod: ORL | Performed by: INTERNAL MEDICINE

## 2021-07-30 ENCOUNTER — LAB REQUISITION (OUTPATIENT)
Dept: LAB | Facility: CLINIC | Age: 86
End: 2021-07-30
Payer: MEDICARE

## 2021-07-30 DIAGNOSIS — I48.91 UNSPECIFIED ATRIAL FIBRILLATION (H): ICD-10-CM

## 2021-08-02 LAB — INR PPP: 4.53 (ref 0.85–1.15)

## 2021-08-02 PROCEDURE — P9604 ONE-WAY ALLOW PRORATED TRIP: HCPCS | Mod: ORL | Performed by: INTERNAL MEDICINE

## 2021-08-02 PROCEDURE — 85610 PROTHROMBIN TIME: CPT | Mod: ORL | Performed by: INTERNAL MEDICINE

## 2021-08-02 PROCEDURE — 36415 COLL VENOUS BLD VENIPUNCTURE: CPT | Mod: ORL | Performed by: INTERNAL MEDICINE

## 2021-08-03 ENCOUNTER — LAB REQUISITION (OUTPATIENT)
Dept: LAB | Facility: CLINIC | Age: 86
End: 2021-08-03
Payer: MEDICARE

## 2021-08-03 DIAGNOSIS — I48.91 UNSPECIFIED ATRIAL FIBRILLATION (H): ICD-10-CM

## 2021-08-04 LAB — INR PPP: 4.03 (ref 0.85–1.15)

## 2021-08-04 PROCEDURE — P9604 ONE-WAY ALLOW PRORATED TRIP: HCPCS | Mod: ORL | Performed by: INTERNAL MEDICINE

## 2021-08-04 PROCEDURE — 85610 PROTHROMBIN TIME: CPT | Mod: ORL | Performed by: INTERNAL MEDICINE

## 2021-08-04 PROCEDURE — 36415 COLL VENOUS BLD VENIPUNCTURE: CPT | Mod: ORL | Performed by: INTERNAL MEDICINE

## 2021-08-05 ENCOUNTER — LAB REQUISITION (OUTPATIENT)
Dept: LAB | Facility: CLINIC | Age: 86
End: 2021-08-05
Payer: MEDICARE

## 2021-08-05 DIAGNOSIS — I48.91 UNSPECIFIED ATRIAL FIBRILLATION (H): ICD-10-CM

## 2021-08-06 LAB — INR PPP: 2.46 (ref 0.85–1.15)

## 2021-08-06 PROCEDURE — 36415 COLL VENOUS BLD VENIPUNCTURE: CPT | Mod: ORL | Performed by: INTERNAL MEDICINE

## 2021-08-06 PROCEDURE — 85610 PROTHROMBIN TIME: CPT | Mod: ORL | Performed by: INTERNAL MEDICINE

## 2021-08-06 PROCEDURE — P9604 ONE-WAY ALLOW PRORATED TRIP: HCPCS | Mod: ORL | Performed by: INTERNAL MEDICINE

## 2021-08-09 ENCOUNTER — LAB REQUISITION (OUTPATIENT)
Dept: LAB | Facility: CLINIC | Age: 86
End: 2021-08-09
Payer: MEDICARE

## 2021-08-09 DIAGNOSIS — I48.91 UNSPECIFIED ATRIAL FIBRILLATION (H): ICD-10-CM

## 2021-08-10 LAB — INR PPP: 2.28 (ref 0.85–1.15)

## 2021-08-10 PROCEDURE — 85610 PROTHROMBIN TIME: CPT | Mod: ORL | Performed by: INTERNAL MEDICINE

## 2021-08-10 PROCEDURE — 36415 COLL VENOUS BLD VENIPUNCTURE: CPT | Mod: ORL | Performed by: INTERNAL MEDICINE

## 2021-08-10 PROCEDURE — P9604 ONE-WAY ALLOW PRORATED TRIP: HCPCS | Mod: ORL | Performed by: INTERNAL MEDICINE

## 2021-08-11 ENCOUNTER — LAB REQUISITION (OUTPATIENT)
Dept: LAB | Facility: CLINIC | Age: 86
End: 2021-08-11
Payer: MEDICARE

## 2021-08-11 DIAGNOSIS — I48.91 UNSPECIFIED ATRIAL FIBRILLATION (H): ICD-10-CM

## 2021-08-13 LAB — INR PPP: 2.71 (ref 0.85–1.15)

## 2021-08-13 PROCEDURE — P9604 ONE-WAY ALLOW PRORATED TRIP: HCPCS | Mod: ORL | Performed by: INTERNAL MEDICINE

## 2021-08-13 PROCEDURE — 36415 COLL VENOUS BLD VENIPUNCTURE: CPT | Mod: ORL | Performed by: INTERNAL MEDICINE

## 2021-08-13 PROCEDURE — 85610 PROTHROMBIN TIME: CPT | Mod: ORL | Performed by: INTERNAL MEDICINE

## 2021-08-16 ENCOUNTER — LAB REQUISITION (OUTPATIENT)
Dept: LAB | Facility: CLINIC | Age: 86
End: 2021-08-16
Payer: MEDICARE

## 2021-08-16 DIAGNOSIS — I48.91 UNSPECIFIED ATRIAL FIBRILLATION (H): ICD-10-CM

## 2021-08-17 LAB
ERYTHROCYTE [DISTWIDTH] IN BLOOD BY AUTOMATED COUNT: 20.2 % (ref 10–15)
HCT VFR BLD AUTO: 35.3 % (ref 35–47)
HGB BLD-MCNC: 10.8 G/DL (ref 11.7–15.7)
INR PPP: 2.35 (ref 0.85–1.15)
MCH RBC QN AUTO: 31.7 PG (ref 26.5–33)
MCHC RBC AUTO-ENTMCNC: 30.6 G/DL (ref 31.5–36.5)
MCV RBC AUTO: 104 FL (ref 78–100)
PLATELET # BLD AUTO: 427 10E3/UL (ref 150–450)
RBC # BLD AUTO: 3.41 10E6/UL (ref 3.8–5.2)
WBC # BLD AUTO: 13.4 10E3/UL (ref 4–11)

## 2021-08-17 PROCEDURE — 36415 COLL VENOUS BLD VENIPUNCTURE: CPT | Mod: ORL | Performed by: INTERNAL MEDICINE

## 2021-08-17 PROCEDURE — P9604 ONE-WAY ALLOW PRORATED TRIP: HCPCS | Mod: ORL | Performed by: INTERNAL MEDICINE

## 2021-08-17 PROCEDURE — 85610 PROTHROMBIN TIME: CPT | Mod: ORL | Performed by: INTERNAL MEDICINE

## 2021-08-17 PROCEDURE — 85027 COMPLETE CBC AUTOMATED: CPT | Mod: ORL | Performed by: INTERNAL MEDICINE

## 2021-08-19 ENCOUNTER — LAB REQUISITION (OUTPATIENT)
Dept: LAB | Facility: CLINIC | Age: 86
End: 2021-08-19
Payer: MEDICARE

## 2021-08-19 DIAGNOSIS — I48.91 UNSPECIFIED ATRIAL FIBRILLATION (H): ICD-10-CM

## 2021-08-20 LAB — INR PPP: 1.93 (ref 0.85–1.15)

## 2021-08-20 PROCEDURE — P9603 ONE-WAY ALLOW PRORATED MILES: HCPCS | Mod: ORL | Performed by: INTERNAL MEDICINE

## 2021-08-20 PROCEDURE — 85610 PROTHROMBIN TIME: CPT | Mod: ORL | Performed by: INTERNAL MEDICINE

## 2021-08-20 PROCEDURE — 36415 COLL VENOUS BLD VENIPUNCTURE: CPT | Mod: ORL | Performed by: INTERNAL MEDICINE

## 2021-08-23 ENCOUNTER — LAB REQUISITION (OUTPATIENT)
Dept: LAB | Facility: CLINIC | Age: 86
End: 2021-08-23
Payer: MEDICARE

## 2021-08-23 DIAGNOSIS — I48.91 UNSPECIFIED ATRIAL FIBRILLATION (H): ICD-10-CM

## 2021-08-24 LAB
INR PPP: 1.89 (ref 0.85–1.15)
TSH SERPL DL<=0.005 MIU/L-ACNC: 4.31 UIU/ML (ref 0.3–5)

## 2021-08-24 PROCEDURE — 84443 ASSAY THYROID STIM HORMONE: CPT | Mod: ORL | Performed by: NURSE PRACTITIONER

## 2021-08-24 PROCEDURE — 36415 COLL VENOUS BLD VENIPUNCTURE: CPT | Mod: ORL | Performed by: INTERNAL MEDICINE

## 2021-08-24 PROCEDURE — 85610 PROTHROMBIN TIME: CPT | Mod: ORL | Performed by: INTERNAL MEDICINE

## 2021-08-24 PROCEDURE — P9604 ONE-WAY ALLOW PRORATED TRIP: HCPCS | Mod: ORL | Performed by: NURSE PRACTITIONER

## 2021-08-24 PROCEDURE — P9604 ONE-WAY ALLOW PRORATED TRIP: HCPCS | Mod: ORL | Performed by: INTERNAL MEDICINE

## 2021-08-30 ENCOUNTER — LAB REQUISITION (OUTPATIENT)
Dept: LAB | Facility: CLINIC | Age: 86
End: 2021-08-30
Payer: COMMERCIAL

## 2021-08-30 DIAGNOSIS — I48.91 UNSPECIFIED ATRIAL FIBRILLATION (H): ICD-10-CM

## 2022-04-07 NOTE — H&P
Resident/Fellow Attestation   I, Bárbara Woods, was present with the medical/LUCILLE student who participated in the service and in the documentation of the note.  I have verified the history and personally performed the physical exam and medical decision making.  I agree with the assessment and plan of care as documented in the note.      Ms. Albarran is an 86 year old female with PMH of aortic stenosis s/p mechanical aortic valve replacement in 2005 on warfarin, dementia, HFpEF, CAD presenting with 2 episodes of bright red then maroon colored stools at nursing home. Concern for lower GI bleed. Ddx include hemorrhoidal bleed, AVMs, diverticular bleed, polyp bleed. GI consulted, appreciate rec's. Hemodynamically stable. Will recheck CBC and INR tomorrow morning. Hold warfarin today.     Bárbara Woods MD  PGY1  Date of Service (when I saw the patient): 02/21/21    [facility]    History and Physical - [place] 2 Service        Date of Admission:  2/21/2021    Assessment & Plan   Louisa Albarran is a 86 year old female admitted on 2/21/2021. She has a history of dementia, DM II, CAD, diastolic dysfunction, HTN, Hazard's disease, diverticulosis, hemorrhoids, polyps with hamartomas found on colonoscopy (2007), mechanical aortic valve on warfarin, septic shock 2/2 cholangitis, dementia, and a-fib presenting with rectal bleeding. Currently hemodynamically stable    # painless hematochezia  # concern for lower GI bleed  # hx hamartomas on colonoscopy  Ddx for a source of bleeding includes diverticulosis, hemorrhoids, hamartoma, angiodysplasia, or malignancy. BUN/Cr ratio of 40 suggests possible fast upper GI bleed w/increased absorption of globin proteins, although hematochezia, clotted blood, and patient history suggest lower GI source is likely source. Patient is hemodynamically stable, favor monitoring patient clinically over a colonoscopy at this time.   - ordered for  transfusion of 2 unit(s) PRBCs in the ED  -IVF 0.9NS @125cc/h  -pantoprazole IV 40 mg BID  -GI following, appreciate recs   >holding off on colonoscopy for now, manage clinically  -consider angiography vs. Tagged RBCs if bleeding persists   -trend hemoglobin, INR, BMP  -hold PTA warfarin, reassess INR tomorrow AM  -NPO for possible procedure in the AM  -If hemodynamically unstable, consider reversal then start heparin gtt  - pain control: PTA tylenol 1g TID, PTA oxycodone 10mg q12h, oxycodone 5mg q6h prn    #HFpEF  #CAD  History of diastolic dysfunction and CAD managed with CABG in 2005, holding BP medications until source of bleeding is controlled. Patient is receiving IVF, will monitor clinically for signs of fluid overload in.   -hold PTA metoprolol 50 mg BID  -hold PTA lasix 20 mg qday  -continue statin 10 mg qday    # Hypothyroidism s/p thyroidectomy  -levothyroxine 150 mcg qday    #DM II  Hgb A1C 7.8 in 2019. Diet controlled at home. Not on any PTA diabetic meds.  - hypoglycemia protocol  - check glucose prn  - if consistently elevated, may start QID glucose checks     Diet: NPO for Medical/Clinical Reasons Except for: No Exceptions    Fluids: 125cc/h IVF 0.9NS  DVT Prophylaxis: Pneumatic Compression Devices, hold AC given GIB  Martel Catheter: not present  Code Status:  DNR/DNI         Disposition Plan   Expected discharge: 2 - 3 days, pending stabilization of Hbg, control of GI bleeding.   Entered: Bishop Mason 02/21/2021, 5:45 PM       The patient's care was discussed with the Attending Physician, Dr. Bryan.    Bishop Mason  Medical Student  [address]  Contact information available via [facility] Paging/Directory  Please see sign in/sign out for up to date coverage information  ______________________________________________________________________    Chief Complaint   Rectal bleeding    Unable to obtain a history from the patient due to mental  status    History of Present Illness   Louisa Albarran is a 86 year old female with past medical history of DM II, CAD, diastolic dysfunction, HTN, Cascade's disease, diverticulosis, hemorrhoids, polyps with hamartomas found on colonoscopy (2007), mechanical aortic valve on warfarin, septic shock 2/2 cholangitis, dementia, and a-fib admitted with rectal bleeding.    Patient was a poor historian 2/2 dementia, so a limited history was obtained. Louisa did not remember when she noticed the bleeding, but per nursing home they first discovered bright red blood in her briefs at 0700 this morning, turning more dark and copious around 1130. They do not remember any event like this previously. Colonoscopy in 2007 was positive for blood in the rectum, sigmoid, and transverse colon. Louisa denies abdominal pain, and is in no apparent discomfort. Outside of a runny nose, she endorses no other complaints. During our conversation, she repeatedly asked to change the channel to cowboys and Indians, and wanted to go upstairs.     Review of Systems    The 10 point Review of Systems is negative other than noted in the HPI or here.    Past Medical History    Past medical history reviewed with no previously diagnosed medical problems.    Past Surgical History   Past surgical history review with no previous surgeries identified.    Social History   History not obtained from the patient    Family History   I have reviewed this patient's family history and updated it with pertinent information if needed.  Family History   Problem Relation Age of Onset     Cancer Sister         ?ovarian cancer     Respiratory Brother        Prior to Admission Medications   Prior to Admission Medications   Prescriptions Last Dose Informant Patient Reported? Taking?   BLOOD GLUCOSE METER KIT  Nursing Home No No   Sig: PER PATIENT HEALTH PLAN   Esomeprazole Magnesium (NEXIUM PO)  Nursing Home Yes No   Sig: Take 40 mg by mouth every morning (before  breakfast)    Ferrous Sulfate 324 (65 FE) MG TBEC   Yes No   Sig: Take 1 tablet by mouth 2 times daily    ONE TOUCH LANCETS MISC  Nursing Home No No   Sig: As directed.   ORDER FOR DME  Nursing Home No No   Sig: Equipment being ordered: Wrap Around hinged knee brace   ORDER FOR DME  Nursing Home No No   Sig: Equipment being ordered: Standard walker with 2 wheels on the fron   QUEtiapine (SEROQUEL) 25 MG tablet  Nursing Home No No   Sig: Take 0.5 tablets (12.5 mg) by mouth At Bedtime   acetaminophen (TYLENOL) 325 MG tablet   No No   Sig: Take 2 tablets (650 mg) by mouth every 6 hours as needed for mild pain   albuterol (2.5 MG/3ML) 0.083% nebulizer solution  Nursing Home No No   Sig: Take 1 vial (2.5 mg) by nebulization every 2 hours as needed for other (dyspnea)   allopurinol (ZYLOPRIM) 100 MG tablet 2/21/2021 at am Nursing Home No Yes   Sig: Take 1 tablet (100 mg) by mouth daily   blood glucose monitoring (NO BRAND SPECIFIED) test strip  Nursing Home No No   Sig: Once daily or as directed   calcium citrate-vitamin D (CITRACAL) 315-200 MG-UNIT TABS  Nursing Home No No   Sig: Take 2 tablets by mouth 2 times daily   hypromellose (ARTIFICIAL TEARS) 0.4 % SOLN  Nursing Home No No   Sig: Place 1 drop into both eyes every hour as needed for dry eyes   ipratropium - albuterol 0.5 mg/2.5 mg/3 mL (DUONEB) 0.5-2.5 (3) MG/3ML nebulization  Nursing Home No No   Sig: Take 1 vial (3 mLs) by nebulization every 4 hours as needed for shortness of breath / dyspnea or wheezing   levothyroxine (SYNTHROID, LEVOTHROID) 175 MCG tablet  Nursing Home No No   Sig: Take 1 tablet (175 mcg) by mouth daily   melatonin 1 MG TABS  Nursing Home No No   Sig: Take 1 tablet (1 mg) by mouth nightly as needed for sleep   metoprolol (LOPRESSOR) 50 MG tablet  Nursing Home No No   Sig: Take 1 tablet (50 mg) by mouth 2 times daily   potassium chloride (K-DUR) 10 MEQ tablet   No No   Sig: Take 1 tablet (10 mEq) by mouth daily   senna-docusate  (SENOKOT-S;PERICOLACE) 8.6-50 MG per tablet  Nursing Home No No   Sig: Take 1-2 tablets by mouth 2 times daily as needed for constipation      Facility-Administered Medications: None     Allergies   Allergies   Allergen Reactions     Ciprofloxacin Other (See Comments)     Phlebitis when given IV       Physical Exam   Vital Signs: Temp: 98.5  F (36.9  C) Temp src: Oral BP: 131/65 Pulse: 108   Resp: 17 SpO2: 100 % O2 Device: None (Room air)    Weight: 0 lbs 0 oz    Physical Exam  HENT:      Head: Normocephalic.   Eyes:      Extraocular Movements: Extraocular movements intact.      Pupils: Pupils are equal, round, and reactive to light.   Cardiovascular:      Rate and Rhythm: Tachycardia present. Rhythm irregular.   Pulmonary:      Effort: Pulmonary effort is normal.      Breath sounds: Normal breath sounds.   Abdominal:      General: Abdomen is flat. Bowel sounds are normal.      Palpations: Abdomen is soft.      Tenderness: There is no abdominal tenderness. There is no guarding.   Skin:     General: Skin is dry.      Coloration: Skin is not pale.      Findings: No rash.   Neurological:      General: No focal deficit present.      Mental Status: She is alert.         Data   Data reviewed today: I reviewed all medications, new labs and imaging results over the last 24 hours. I personally reviewed the EKG tracing showing an old left bundle branch block, atrial fibrillation with rapid ventricular response.    Recent Labs   Lab 02/21/21  1356   WBC 10.7   HGB 7.6*   MCV 99   *   INR 2.90*      POTASSIUM 4.3   CHLORIDE 108   CO2 26   BUN 31*   CR 0.78   ANIONGAP 7   BRINDA 8.7   *   ALBUMIN 2.1*   PROTTOTAL 6.1*   BILITOTAL 0.3   ALKPHOS 139   ALT 9   AST 13   TROPI <0.015     Results for orders placed or performed during the hospital encounter of 02/21/21   XR Chest Port 1 View    Impression    IMPRESSION:  Pulmonary vascular congestion with mild streaky left basilar  atelectasis.        ABDOMINAL PAIN